# Patient Record
Sex: MALE | Race: WHITE | Employment: FULL TIME | URBAN - METROPOLITAN AREA
[De-identification: names, ages, dates, MRNs, and addresses within clinical notes are randomized per-mention and may not be internally consistent; named-entity substitution may affect disease eponyms.]

---

## 2021-05-04 ENCOUNTER — HOSPITAL ENCOUNTER (EMERGENCY)
Age: 31
Discharge: HOME | End: 2021-05-04
Attending: EMERGENCY MEDICINE
Payer: COMMERCIAL

## 2021-05-04 VITALS
OXYGEN SATURATION: 96 % | HEIGHT: 72 IN | RESPIRATION RATE: 16 BRPM | DIASTOLIC BLOOD PRESSURE: 99 MMHG | TEMPERATURE: 99.3 F | WEIGHT: 286 LBS | BODY MASS INDEX: 38.74 KG/M2 | SYSTOLIC BLOOD PRESSURE: 147 MMHG | HEART RATE: 90 BPM

## 2021-05-04 DIAGNOSIS — Z77.098 EXPOSURE TO CHEMICAL COMPOUNDS: Primary | ICD-10-CM

## 2021-05-04 LAB
AMPHETAMINE SCREEN URINE: NEGATIVE
BARBITURATE SCREEN URINE: NEGATIVE
BENZODIAZEPINE SCREEN, URINE: NEGATIVE
BUPRENORPHINE URINE: ABNORMAL
CANNABINOID SCREEN URINE: POSITIVE
COCAINE METABOLITE, URINE: NEGATIVE
MDMA URINE: ABNORMAL
METHADONE SCREEN, URINE: NEGATIVE
METHAMPHETAMINE, URINE: ABNORMAL
OPIATES, URINE: NEGATIVE
OXYCODONE SCREEN URINE: NEGATIVE
PHENCYCLIDINE, URINE: NEGATIVE
PROPOXYPHENE, URINE: ABNORMAL
TEST INFORMATION: ABNORMAL
TRICYCLIC ANTIDEPRESSANTS, UR: ABNORMAL

## 2021-05-04 PROCEDURE — 80307 DRUG TEST PRSMV CHEM ANLYZR: CPT

## 2021-05-04 PROCEDURE — 99283 EMERGENCY DEPT VISIT LOW MDM: CPT

## 2021-05-04 ASSESSMENT — ENCOUNTER SYMPTOMS
COUGH: 0
SORE THROAT: 0
ABDOMINAL PAIN: 0
NAUSEA: 0
SHORTNESS OF BREATH: 0
VOMITING: 0

## 2021-05-04 NOTE — ED PROVIDER NOTES
[id number] [facility]  [address] [facility] [address]  Phone: [phone]  Fax: [phone]      Attending Physician Attestation    I performed a history and physical examination of the patient and discussed management with the mid level provider. I reviewed the mid level provider's note and agree with the documented findings and plan of care. Any areas of disagreement are noted on the chart. I was personally present for the key portions of any procedures. I have documented in the chart those procedures where I was not present during the key portions. I have reviewed the emergency nurses triage note. I agree with the chief complaint, past medical history, past surgical history, allergies, medications, social and family history as documented unless otherwise noted below. Documentation of the HPI, Physical Exam and Medical Decision Making performed by mid level providers is based on my personal performance of the HPI, PE and MDM. For Physician Assistant/ Nurse Practitioner cases/documentation I have personally evaluated this patient and have completed at least one if not all key elements of the E/M (history, physical exam, and MDM). Additional findings are as noted. CHIEF COMPLAINT       Chief Complaint   Patient presents with    Chemical Exposure     states that he may have come in contact with possibly suboxone or fentanyl while going through his ex's backpack who is addicted and found a straw     Tachycardia     stayed around 130bpm for about 1 hour, came in contact with substance around [address] is a 32 y.o. male who presents with possible chemical exposure, tachycardia, no symptoms at current time. PAST MEDICAL HISTORY    has no past medical history on file. SURGICAL HISTORY      has no past surgical history on file.     CURRENT MEDICATIONS       Previous Medications    No medications on file       ALLERGIES     is allergic to penicillins. FAMILY HISTORY     has no family status information on file. family history is not on file. SOCIAL HISTORY          PHYSICAL EXAM     INITIAL VITALS:  height is 6' (1.829 m) and weight is 129.7 kg (286 lb). His oral temperature is 99.3 °F (37.4 °C). His blood pressure is 147/99 (abnormal) and his pulse is 90. His respiration is 16 and oxygen saturation is 96%. The head is normocephalic The neck is supple trachea midline no adenopathy no meningeal signs  Cardiac S1-S2 with a regular rate and rhythm  Pulmonary is clear to auscultation bilateral  Abdomen is soft nontender nondistended with positive bowel sounds  Extremities are warm and dry with good pulses motor sensation throughout  Skin is without rashes or lesions  Neurologic GCS is 15 and no focal deficits are appreciated    DIAGNOSTIC RESULTS         LABS:  Results for orders placed or performed during the hospital encounter of 05/04/21   Urine Drug Screen   Result Value Ref Range    Amphetamine Screen, Ur NEGATIVE NEGATIVE    Barbiturate Screen, Ur NEGATIVE NEGATIVE    Benzodiazepine Screen, Urine NEGATIVE NEGATIVE    Cocaine Metabolite, Urine NEGATIVE NEGATIVE    Methadone Screen, Urine NEGATIVE NEGATIVE    Opiates, Urine NEGATIVE NEGATIVE    Phencyclidine, Urine NEGATIVE NEGATIVE    Propoxyphene, Urine NOT REPORTED NEGATIVE    Cannabinoid Scrn, Ur POSITIVE (A) NEGATIVE    Oxycodone Screen, Ur NEGATIVE NEGATIVE    Methamphetamine, Urine NOT REPORTED NEGATIVE    Tricyclic Antidepressants, Urine NOT REPORTED NEGATIVE    MDMA, Urine NOT REPORTED NEGATIVE    Buprenorphine Urine NOT REPORTED NEGATIVE    Test Information       Assay provides medical screening only. The absence of expected drug(s) and/or metabolite(s) may indicate diluted or adulterated urine, limitations of testing or timing of collection.            EMERGENCY DEPARTMENT COURSE:   Vitals:    Vitals:    05/04/21 1730   BP: (!) 147/99   Pulse: 90   Resp: 16 Temp: 99.3 °F (37.4 °C)   TempSrc: Oral   SpO2: 96%   Weight: 129.7 kg (286 lb)   Height: 6' (1.829 m)     -------------------------  BP: (!) 147/99, Temp: 99.3 °F (37.4 °C), Pulse: 90, Resp: 16      PERTINENT ATTENDING PHYSICIAN COMMENTS:    Urine tox screen is positive for marijuana use which the patient openly admits to the patient has no complaints feels at his baseline status has no respiratory depression given this I do feel able to be followed up as an outpatient      (Please note that portions of this note were completed with a voice recognition program.  Efforts were made to edit the dictations but occasionally words are mis-transcribed.)    Vu MD, F.A.C.E.P.   Attending Emergency Medicine Physician       Dm Patton MD  05/04/21 3798

## 2021-05-04 NOTE — ED NOTES
Patient provided with discharge instructions, prescriptions, and follow up information. Verbalized understanding. No IV access to discontinue. A&OX3. Steady gait noted at discharge. Wheelchair declined by patient.       Dez Hernandes RN  05/04/21 5618

## 2021-05-04 NOTE — ED PROVIDER NOTES
[address] [facility]  [address] [facility] [address]  Phone: [phone]  Fax: [phone]        Pt Name: Joey Castorena  MRN: [id number]  [place] 1990  Date of evaluation: 5/4/21    [address] [place] [address]       Chief Complaint   Patient presents with    Chemical Exposure     states that he may have come in contact with possibly suboxone or fentanyl while going through his ex's backpack who is addicted and found a straw     Tachycardia     stayed around 130bpm for about 1 hour, came in contact with substance around 1515       HISTORY OF PRESENT ILLNESS (Location/Symptom, Timing/Onset, Context/Setting, Quality, Duration, Modifying Factors, Severity)      Joey Castorena is a 32 y.o. male with no pertinent PMH who presents to the ED via private auto with possible exposure to illicit drug. Patient states that around 315 this afternoon he had jumped his ex-girlfriend's backpack and out onto the floor in his home. He reports that he was going through it to make sure there was nothing else in there though the patient says that the ex-girlfriend (who is now at rehab) stated that there was not, however, he did notice a straw in a cookie bag and he knows that she is addicted to fentanyl so he is concerned because he may have touched things that may have had fentanyl in him. He reports that he does not recall touching his eyes or face. He did wash his hands immediately afterwards. Patient says that shortly afterward he began experiencing tachycardia and his heart rate was around 130 bpm for about an hour. Patient states that it was either \"anxiety or the fentanyl. \"  He says that he laid down and that his heart rate improved however he did want to get checked out to make sure there were not any other complications of these medications. Denies any other medications in the bag other than her antidepressants. Denies any oral or nasal intake of these substances.   He does note that he was little lightheaded when he was tachycardic, but says that this is completely resolved. Denies any exacerbating relieving factors. Denies taking any medication for his discomfort. Denies any fever, chills, vision changes, syncope, nausea, vomiting, diarrhea, abdominal pain, chest pain, shortness of breath, or any other concerns at this time. PAST MEDICAL / SURGICAL / SOCIAL / FAMILY HISTORY     PMH:  has no past medical history on file. Surgical History:  has no past surgical history on file. Social History:    Family History: has no family status information on file. family history is not on file. Psychiatric History: None    Allergies: Penicillins    Home Medications:   Prior to Admission medications    Not on File       REVIEW OF SYSTEMS  (2-9 systems for level 4, 10 ormore for level 5)      Review of Systems   Constitutional: Negative for chills and fever. HENT: Negative for sore throat. Eyes: Negative for visual disturbance. Respiratory: Negative for cough and shortness of breath. Cardiovascular: Negative for chest pain. Gastrointestinal: Negative for abdominal pain, nausea and vomiting. Skin: Negative for rash. Allergic/Immunologic: Negative for immunocompromised state. Neurological: Positive for light-headedness (Now resolved). Negative for tremors, seizures, syncope, weakness and headaches. Psychiatric/Behavioral: Negative for agitation. PHYSICAL EXAM  (up to 7 for level 4, 8 or more for level 5)      INITIAL VITALS:  height is 6' (1.829 m) and weight is 129.7 kg (286 lb). His oral temperature is 99.3 °F (37.4 °C). His blood pressure is 147/99 (abnormal) and his pulse is 90. His respiration is 16 and oxygen saturation is 96%. Vital signs reviewed. Physical Exam    General:  Alert, cooperative, well-groomed, well-nourished, appears stated age, and is in no acute distress. Head:  Normocephalic, atraumatic, and without obvious abnormality.    Eyes:  Sclerae/conjunctivae Opiates, Urine NEGATIVE NEGATIVE    Phencyclidine, Urine NEGATIVE NEGATIVE    Propoxyphene, Urine NOT REPORTED NEGATIVE    Cannabinoid Scrn, Ur POSITIVE (A) NEGATIVE    Oxycodone Screen, Ur NEGATIVE NEGATIVE    Methamphetamine, Urine NOT REPORTED NEGATIVE    Tricyclic Antidepressants, Urine NOT REPORTED NEGATIVE    MDMA, Urine NOT REPORTED NEGATIVE    Buprenorphine Urine NOT REPORTED NEGATIVE    Test Information       Assay provides medical screening only. The absence of expected drug(s) and/or metabolite(s) may indicate diluted or adulterated urine, limitations of testing or timing of collection. EMERGENCY DEPARTMENT COURSE           Vitals:    Vitals:    05/04/21 1730   BP: (!) 147/99   Pulse: 90   Resp: 16   Temp: 99.3 °F (37.4 °C)   TempSrc: Oral   SpO2: 96%   Weight: 129.7 kg (286 lb)   Height: 6' (1.829 m)     -------------------------  BP: (!) 147/99, Temp: 99.3 °F (37.4 °C), Pulse: 90, Resp: 16      RE-EVALUATION:  Patient updated regarding results which were negative other than cannabis which he has smoked in the past, but says that he stopped on April 10 and is trying to stop completely. The patient and/or family and I have discussed the diagnosis and risks, and we agree with discharging home to follow-up with their primary doctor and he was given the same day number to establish care. The patient appears stable for discharge and has been instructed to return immediately for new concerning symptoms, if the symptoms worsen in any way, or in 8-12 hours if not improved for re-evaluation. We have discussed the symptoms which are most concerning (e.g., fever, changing or worsening pain, persistent vomiting, bloody stools, chest pain, shortness of breath, numbness or weakness to the arms or legs, coolness or color change to the arms or legs) that necessitate immediate return.      The patient understands that at this time there is no evidence for a more malignant underlying process, but the patient also understands that early in the process of an illness or injury, an emergency department workup can be falsely reassuring. Routine discharge counseling was given, and the patient understands that worsening, changing or persistent symptoms should prompt an immediate call or follow up with their primary physician or return to the emergency department. The importance of appropriate follow up was also discussed. I have reviewed the disposition diagnosis with the patient and or their family/guardian. I have answered their questions and given discharge instructions. They voiced understanding of these instructions and did not have any further questions or complaints. This patient was seen by the attending physician and they agreed with the assessment and plan. CONSULTS:  None    PROCEDURES:  None    FINAL IMPRESSION      1. Exposure to chemical compounds          DISPOSITION / PLAN     CONDITION ON DISPOSITION:   Good / Stable for discharge. PATIENT REFERRED TO:  Your PCP or call [phone]    Call today  To schedule appointment to establish care with a primary care provider      DISCHARGE MEDICATIONS:  There are no discharge medications for this patient.       Keith Dlilard PA-C   Emergency Medicine Physician Assistant    (Please note that portions of this note were completed with a voice recognition program.  Efforts were made to edit the dictations but occasionally words aremis-transcribed.)        Keith Dillard PA-C  05/04/21 8886

## 2024-05-06 ENCOUNTER — OFFICE VISIT (OUTPATIENT)
Age: 34
End: 2024-05-06
Payer: COMMERCIAL

## 2024-05-06 VITALS — WEIGHT: 286 LBS | BODY MASS INDEX: 38.74 KG/M2 | HEIGHT: 72 IN

## 2024-05-06 DIAGNOSIS — S52.502A CLOSED FRACTURE OF DISTAL END OF LEFT RADIUS, UNSPECIFIED FRACTURE MORPHOLOGY, INITIAL ENCOUNTER: Primary | ICD-10-CM

## 2024-05-06 DIAGNOSIS — M25.532 LEFT WRIST PAIN: ICD-10-CM

## 2024-05-06 PROCEDURE — 25600 CLTX DST RDL FX/EPHYS SEP WO: CPT | Performed by: NURSE PRACTITIONER

## 2024-05-06 PROCEDURE — 99204 OFFICE O/P NEW MOD 45 MIN: CPT | Performed by: NURSE PRACTITIONER

## 2024-05-06 NOTE — PROGRESS NOTES
[facility]      [facility] PHYSICIANS [facility], [facility]  [facility] ORTHOPAEDICS [facility] SPORTS MEDICINE  [address]  [place] [address]  Dept: [phone]  Dept Fax: [phone]    Chief Compliant:  Chief Complaint   Patient presents with    Wrist Pain     L wrist fx        History of Present Illness:  This is a pleasant 34 y.o. male who is here for evaluation of a left distal radius fracture. States he slipped on a mat at home yesterday morning, falling backwards onto an outstretched left wrist. He went to a local urgent care and was diagnosed with a wrist fracture then placed in a cock up wrist brace. He states his pain is controlled as long as he does not move his thumb. He denies any new injuries or falls. Denies any numbness or tingling. 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 History of left ulnar shaft fracture as a child that did not heal correctly per patient.     Physical Exam: Brace removed. Skin intact. Swelling and ecchymosis. Able to wrinkle the skin. Able to make a complete fist. Sensation intact to light touch.     Imagin views of the left wrist demonstrate an acute, intra-articular  impacted distal radius fracture with no significant change in alignment compared to previous imaging. Overlying cast material in place.       Assessment and Plan:    This is a pleasant 34 y.o. male who has a left intra-articular distal radius fracture. Reviewed imaging with the patient and discussed plan of care. Patient was placed in a short arm cast and tolerated the procedure well. Discussed the current plan is to treat non-operatively with immobilization in a short arm cast but explained that there is still a chance that he may need surgery if the fracture is not stable/loss of radial height. Plan to follow up in 1 week for repeat xrays. Instructed to elevate the arm, NSAIDs, no lifting/pulling/pushing with the left arm.

## 2024-05-14 ENCOUNTER — OFFICE VISIT (OUTPATIENT)
Age: 34
End: 2024-05-14

## 2024-05-14 VITALS — BODY MASS INDEX: 38.74 KG/M2 | WEIGHT: 286 LBS | HEIGHT: 72 IN

## 2024-05-14 DIAGNOSIS — S52.502A CLOSED FRACTURE OF DISTAL END OF LEFT RADIUS, UNSPECIFIED FRACTURE MORPHOLOGY, INITIAL ENCOUNTER: ICD-10-CM

## 2024-05-14 DIAGNOSIS — M25.532 LEFT WRIST PAIN: Primary | ICD-10-CM

## 2024-05-14 PROCEDURE — 99024 POSTOP FOLLOW-UP VISIT: CPT | Performed by: ORTHOPAEDIC SURGERY

## 2024-05-14 RX ORDER — IBUPROFEN 800 MG/1
800 TABLET ORAL EVERY 8 HOURS PRN
COMMUNITY
Start: 2024-05-05

## 2024-05-14 RX ORDER — HYDROCODONE BITARTRATE AND ACETAMINOPHEN 5; 325 MG/1; MG/1
TABLET ORAL
COMMUNITY
Start: 2024-05-05

## 2024-05-14 RX ORDER — ESCITALOPRAM OXALATE 10 MG/1
TABLET ORAL
COMMUNITY
Start: 2024-03-22

## 2024-05-14 RX ORDER — AZITHROMYCIN 250 MG/1
TABLET, FILM COATED ORAL
COMMUNITY
Start: 2024-02-05

## 2024-05-15 NOTE — PROGRESS NOTES
[facility]      [facility] PHYSICIANS [facility]  [facility] ORTHOPAEDICS AND SPORTS MEDICINE  [address]  [place] [address]  Dept: [phone]  Dept Fax: [phone]    Chief Compliant:  Chief Complaint   Patient presents with    Fracture     Left wrist fx f/u        History of Present Illness:  This is a pleasant 34 y.o. male who is 1 week into conservative treatment for a left distal radius fracture intra-articular minimally displaced.  He has been in a cast.  Pain is controlled.    Physical Exam: The left wrist cast is well-formed.  I did cut back a couple areas around the thumb.    Imaging: X-rays of the left wrist 2 views taken today show good alignment of his distal radius fracture.  This is intra-articular.  There is been no significant displacement.  Articular displacement is 1.2 mm.  There is still good radial height and volar tilt and inclination.      Assessment and Plan:    This is a pleasant 34 y.o. male who has a left distal radius intra-articular fracture that is still appropriate for nonoperative treatment.  Will see him back in 1 week with repeat x-rays in the cast AP and lateral.         Past History:    Current Outpatient Medications:     azithromycin (ZITHROMAX) 250 MG tablet, TAKE 2 TABLETS BY MOUTH ON DAY 1, THEN 1 TABLET DAILY ON DAYS 2 THRU 5  START 2 DAYS PRIOR TO APPOINTMENT, Disp: , Rfl:     escitalopram (LEXAPRO) 10 MG tablet, Take one tablet daily along with 5 mg daily to equal 15 mg daily, Disp: , Rfl:     HYDROcodone-acetaminophen (NORCO) 5-325 MG per tablet, Take 1 tablet by mouth every 6 hours as needed (severe pain) for up to 3 days.  Max Daily Amount 4 tablets, Disp: , Rfl:     ibuprofen (ADVIL;MOTRIN) 800 MG tablet, Take 1 tablet by mouth every 8 hours as needed, Disp: , Rfl:   Allergies   Allergen Reactions    Penicillins Anaphylaxis     Social History     Socioeconomic History    Marital status: Single

## 2024-05-23 ENCOUNTER — OFFICE VISIT (OUTPATIENT)
Age: 34
End: 2024-05-23

## 2024-05-23 VITALS — HEIGHT: 72 IN | WEIGHT: 286 LBS | BODY MASS INDEX: 38.74 KG/M2

## 2024-05-23 DIAGNOSIS — M25.532 LEFT WRIST PAIN: Primary | ICD-10-CM

## 2024-05-23 DIAGNOSIS — S52.502A CLOSED FRACTURE OF DISTAL END OF LEFT RADIUS, UNSPECIFIED FRACTURE MORPHOLOGY, INITIAL ENCOUNTER: ICD-10-CM

## 2024-05-23 PROCEDURE — 99024 POSTOP FOLLOW-UP VISIT: CPT | Performed by: ORTHOPAEDIC SURGERY

## 2024-05-23 NOTE — PROGRESS NOTES
[facility] Orthopedics & Sports Medicine      [facility] PHYSICIANS [facility]  [facility] OR[facility]OPAED[facility]ORTS MEDICINE  [address]  [place] [address]  Dept: [phone]  Dept Fax: [phone]    Chief Compliant:  Chief Complaint   Patient presents with    Fracture     Left wrist fx f/u        History of Present Illness:  This is a pleasant 34 y.o. male who is now about 2 and half to 3 weeks into conservative treatment for a left distal radius intra-articular fracture.  He is in a short arm cast.  He states that he has been doing well although he did have an increase in pain over the radial aspect of the distal third forearm earlier this morning but he states that has gotten better.    Physical Exam: The left short arm cast is well-fitting.  There is no skin breakdown.    Imaging: X-rays of the left wrist 2 views taken today show relatively unchanged alignment.  Radial inclination is 22 degrees and same compared to prior x-ray.  Radial height looks good with no loss of radial height.  There is 15 degrees of volar tilt which is a few degrees more than prior x-ray.  The intra-articular displacement remains less than 2 mm.      Assessment and Plan:    This is a pleasant 34 y.o. male who is status post the above.  His fracture is intra-articular however the alignment is holding with short arm casting.  I do not see significant articular displacement, shortening, loss of radial inclination or significant change and volar tilt to necessitate surgery.  Because it is intra-articular I do still want to keep a close eye on him and we will repeat x-rays in 1 week with Shanika Figueroa CNP         Past History:    Current Outpatient Medications:     azithromycin (ZITHROMAX) 250 MG tablet, TAKE 2 TABLETS BY MOUTH ON DAY 1, THEN 1 TABLET DAILY ON DAYS 2 THRU 5  START 2 DAYS PRIOR TO APPOINTMENT, Disp: , Rfl:     escitalopram (LEXAPRO) 10 MG tablet, Take one tablet daily along with 5 mg

## 2024-05-30 ENCOUNTER — ANESTHESIA EVENT (OUTPATIENT)
Dept: OPERATING ROOM | Age: 34
End: 2024-05-30
Payer: COMMERCIAL

## 2024-05-30 ENCOUNTER — OFFICE VISIT (OUTPATIENT)
Age: 34
End: 2024-05-30
Payer: COMMERCIAL

## 2024-05-30 VITALS — BODY MASS INDEX: 38.74 KG/M2 | WEIGHT: 286 LBS | HEIGHT: 72 IN

## 2024-05-30 DIAGNOSIS — S52.502A CLOSED FRACTURE OF DISTAL END OF LEFT RADIUS, UNSPECIFIED FRACTURE MORPHOLOGY, INITIAL ENCOUNTER: Primary | ICD-10-CM

## 2024-05-30 DIAGNOSIS — M25.532 LEFT WRIST PAIN: ICD-10-CM

## 2024-05-30 PROCEDURE — 99213 OFFICE O/P EST LOW 20 MIN: CPT | Performed by: ORTHOPAEDIC SURGERY

## 2024-05-30 NOTE — PROGRESS NOTES
[facility] Orthopedics & Sports Medicine      [facility] PHYSICIANS [facility], [facility]  [facility] ORTHOPAEDICS AND SPORTS MEDICINE  [address]  [place] [address]  Dept: [phone]  Dept Fax: [phone]    Chief Compliant:  Chief Complaint   Patient presents with    Fracture     Left wrist fx f/u        History of Present Illness:  This is a pleasant 34 y.o. male who is here for follow up regarding his left distal radius fracture that occurred on 24, almost 3 weeks ago. He has been managed conservatively in a short arm cast with weekly xrays. He states he has minimal to no pain. Denies any issues with the cast. Denies any new injuries or falls.     Physical Exam: Left wrist: Short arm cast in place, in good condition. Exposed digits have good range of motion, sensation intact, good capillary refill.     Imagin views of the left wrist re-demonstrate a comminuted, impacted distal radius fracture with some loss of radial height compared to previous imaging. The intra-articular displacement looks relatively unchanged. Overlying cast material in place.       Assessment and Plan:    This is a pleasant 34 y.o. male who has a left intra-articular distal radius fracture. Reviewed imaging with the patient and again discussed risks/benefits of conservative and surgical treatment options. He is now 3 weeks from initial injury, treated conservatively in a short arm cast and unfortunately the fracture continues to shift. At this time, I recommend surgical intervention for stability, to restore anatomic alignment is much as possible as well as to prevent further displacement I discussed with the patient risks benefits and alternatives of distal radius fracture open reduction internal fixation. Risks include but are not limited to long-term stiffness, infection, injury to surrounding structures, nonunion, symptomatic hardware, risk from anesthetic. Patient verbalized understanding and

## 2024-05-31 ENCOUNTER — ANESTHESIA (OUTPATIENT)
Dept: OPERATING ROOM | Age: 34
End: 2024-05-31
Payer: COMMERCIAL

## 2024-05-31 ENCOUNTER — HOSPITAL ENCOUNTER (OUTPATIENT)
Age: 34
Setting detail: OUTPATIENT SURGERY
Discharge: HOME | End: 2024-05-31
Attending: ORTHOPAEDIC SURGERY | Admitting: ORTHOPAEDIC SURGERY
Payer: COMMERCIAL

## 2024-05-31 ENCOUNTER — APPOINTMENT (OUTPATIENT)
Dept: GENERAL RADIOLOGY | Age: 34
End: 2024-05-31
Attending: ORTHOPAEDIC SURGERY
Payer: COMMERCIAL

## 2024-05-31 VITALS
HEIGHT: 72 IN | TEMPERATURE: 96.8 F | DIASTOLIC BLOOD PRESSURE: 75 MMHG | WEIGHT: 286 LBS | OXYGEN SATURATION: 95 % | SYSTOLIC BLOOD PRESSURE: 136 MMHG | BODY MASS INDEX: 38.74 KG/M2 | HEART RATE: 100 BPM | RESPIRATION RATE: 14 BRPM

## 2024-05-31 DIAGNOSIS — G89.18 POST-OP PAIN: Primary | ICD-10-CM

## 2024-05-31 PROBLEM — S52.572A OTHER INTRAARTICULAR FRACTURE OF LOWER END OF LEFT RADIUS, INITIAL ENCOUNTER FOR CLOSED FRACTURE: Status: ACTIVE | Noted: 2024-05-31

## 2024-05-31 PROCEDURE — 2720000010 HC SURG SUPPLY STERILE: Performed by: ORTHOPAEDIC SURGERY

## 2024-05-31 PROCEDURE — C1713 ANCHOR/SCREW BN/BN,TIS/BN: HCPCS | Performed by: ORTHOPAEDIC SURGERY

## 2024-05-31 PROCEDURE — 6360000002 HC RX W HCPCS

## 2024-05-31 PROCEDURE — 7100000011 HC PHASE II RECOVERY - ADDTL 15 MIN: Performed by: ORTHOPAEDIC SURGERY

## 2024-05-31 PROCEDURE — 64415 NJX AA&/STRD BRCH PLXS IMG: CPT | Performed by: ANESTHESIOLOGY

## 2024-05-31 PROCEDURE — 7100000000 HC PACU RECOVERY - FIRST 15 MIN: Performed by: ORTHOPAEDIC SURGERY

## 2024-05-31 PROCEDURE — 3600000004 HC SURGERY LEVEL 4 BASE: Performed by: ORTHOPAEDIC SURGERY

## 2024-05-31 PROCEDURE — 7100000001 HC PACU RECOVERY - ADDTL 15 MIN: Performed by: ORTHOPAEDIC SURGERY

## 2024-05-31 PROCEDURE — 2500000003 HC RX 250 WO HCPCS: Performed by: SPECIALIST

## 2024-05-31 PROCEDURE — 2709999900 HC NON-CHARGEABLE SUPPLY: Performed by: ORTHOPAEDIC SURGERY

## 2024-05-31 PROCEDURE — 25609 OPTX DST RD XART FX/EP SEP3+: CPT | Performed by: ORTHOPAEDIC SURGERY

## 2024-05-31 PROCEDURE — 2580000003 HC RX 258: Performed by: ANESTHESIOLOGY

## 2024-05-31 PROCEDURE — 3700000001 HC ADD 15 MINUTES (ANESTHESIA): Performed by: ORTHOPAEDIC SURGERY

## 2024-05-31 PROCEDURE — 6360000002 HC RX W HCPCS: Performed by: ANESTHESIOLOGY

## 2024-05-31 PROCEDURE — 6360000002 HC RX W HCPCS: Performed by: NURSE PRACTITIONER

## 2024-05-31 PROCEDURE — 3700000000 HC ANESTHESIA ATTENDED CARE: Performed by: ORTHOPAEDIC SURGERY

## 2024-05-31 PROCEDURE — 3600000014 HC SURGERY LEVEL 4 ADDTL 15MIN: Performed by: ORTHOPAEDIC SURGERY

## 2024-05-31 PROCEDURE — 2500000003 HC RX 250 WO HCPCS: Performed by: ANESTHESIOLOGY

## 2024-05-31 PROCEDURE — 6360000002 HC RX W HCPCS: Performed by: SPECIALIST

## 2024-05-31 PROCEDURE — 25609 OPTX DST RD XART FX/EP SEP3+: CPT | Performed by: NURSE PRACTITIONER

## 2024-05-31 PROCEDURE — 7100000010 HC PHASE II RECOVERY - FIRST 15 MIN: Performed by: ORTHOPAEDIC SURGERY

## 2024-05-31 PROCEDURE — 6370000000 HC RX 637 (ALT 250 FOR IP)

## 2024-05-31 DEVICE — SCREW BNE L14MM DIA2.7MM CORT S STL ST T8 STARDRV RECESS: Type: IMPLANTABLE DEVICE | Site: WRIST | Status: FUNCTIONAL

## 2024-05-31 DEVICE — SCREW BNE L16MM DIA2.4MM CORT S STL ST T8 STARDRV RECESS: Type: IMPLANTABLE DEVICE | Site: WRIST | Status: FUNCTIONAL

## 2024-05-31 DEVICE — SCREW BNE L20MM DIA2.4MM DST RAD VOLAR S STL ST VAR ANG LOK: Type: IMPLANTABLE DEVICE | Site: WRIST | Status: FUNCTIONAL

## 2024-05-31 DEVICE — SCREW BNE L22MM DIA2.4MM DST RAD VOLAR S STL ST VAR ANG LOK: Type: IMPLANTABLE DEVICE | Site: WRIST | Status: FUNCTIONAL

## 2024-05-31 DEVICE — PLATE BNE L55MM 7X3 H L VOLAR DST RAD S STL VAR ANG LOK: Type: IMPLANTABLE DEVICE | Site: WRIST | Status: FUNCTIONAL

## 2024-05-31 RX ORDER — CEFAZOLIN 2 G/1
INJECTION, POWDER, FOR SOLUTION INTRAMUSCULAR; INTRAVENOUS
Status: DISCONTINUED
Start: 2024-05-31 | End: 2024-05-31 | Stop reason: HOSPADM

## 2024-05-31 RX ORDER — OXYCODONE HYDROCHLORIDE AND ACETAMINOPHEN 5; 325 MG/1; MG/1
2 TABLET ORAL
Status: DISCONTINUED | OUTPATIENT
Start: 2024-05-31 | End: 2024-05-31 | Stop reason: HOSPADM

## 2024-05-31 RX ORDER — SODIUM CHLORIDE 0.9 % (FLUSH) 0.9 %
5-40 SYRINGE (ML) INJECTION EVERY 12 HOURS SCHEDULED
Status: DISCONTINUED | OUTPATIENT
Start: 2024-05-31 | End: 2024-05-31 | Stop reason: HOSPADM

## 2024-05-31 RX ORDER — SODIUM CHLORIDE 0.9 % (FLUSH) 0.9 %
5-40 SYRINGE (ML) INJECTION PRN
Status: DISCONTINUED | OUTPATIENT
Start: 2024-05-31 | End: 2024-05-31 | Stop reason: HOSPADM

## 2024-05-31 RX ORDER — NALOXONE HYDROCHLORIDE 0.4 MG/ML
INJECTION, SOLUTION INTRAMUSCULAR; INTRAVENOUS; SUBCUTANEOUS PRN
Status: DISCONTINUED | OUTPATIENT
Start: 2024-05-31 | End: 2024-05-31 | Stop reason: HOSPADM

## 2024-05-31 RX ORDER — MIDAZOLAM HYDROCHLORIDE 2 MG/2ML
2 INJECTION, SOLUTION INTRAMUSCULAR; INTRAVENOUS ONCE
Status: COMPLETED | OUTPATIENT
Start: 2024-05-31 | End: 2024-05-31

## 2024-05-31 RX ORDER — ROPIVACAINE HYDROCHLORIDE 5 MG/ML
INJECTION, SOLUTION EPIDURAL; INFILTRATION; PERINEURAL
Status: COMPLETED | OUTPATIENT
Start: 2024-05-31 | End: 2024-05-31

## 2024-05-31 RX ORDER — ONDANSETRON 2 MG/ML
4 INJECTION INTRAMUSCULAR; INTRAVENOUS
Status: COMPLETED | OUTPATIENT
Start: 2024-05-31 | End: 2024-05-31

## 2024-05-31 RX ORDER — OXYCODONE HYDROCHLORIDE AND ACETAMINOPHEN 5; 325 MG/1; MG/1
1-2 TABLET ORAL EVERY 6 HOURS PRN
Qty: 40 TABLET | Refills: 0 | Status: SHIPPED | OUTPATIENT
Start: 2024-05-31 | End: 2024-06-07

## 2024-05-31 RX ORDER — EPHEDRINE SULFATE/0.9% NACL/PF 25 MG/5 ML
SYRINGE (ML) INTRAVENOUS PRN
Status: DISCONTINUED | OUTPATIENT
Start: 2024-05-31 | End: 2024-05-31 | Stop reason: SDUPTHER

## 2024-05-31 RX ORDER — FENTANYL CITRATE 50 UG/ML
100 INJECTION, SOLUTION INTRAMUSCULAR; INTRAVENOUS ONCE
Status: COMPLETED | OUTPATIENT
Start: 2024-05-31 | End: 2024-05-31

## 2024-05-31 RX ORDER — IPRATROPIUM BROMIDE AND ALBUTEROL SULFATE 2.5; .5 MG/3ML; MG/3ML
1 SOLUTION RESPIRATORY (INHALATION)
Status: DISCONTINUED | OUTPATIENT
Start: 2024-05-31 | End: 2024-05-31 | Stop reason: HOSPADM

## 2024-05-31 RX ORDER — MIDAZOLAM HYDROCHLORIDE 2 MG/2ML
2 INJECTION, SOLUTION INTRAMUSCULAR; INTRAVENOUS
Status: DISCONTINUED | OUTPATIENT
Start: 2024-05-31 | End: 2024-05-31 | Stop reason: HOSPADM

## 2024-05-31 RX ORDER — SODIUM CHLORIDE 9 MG/ML
INJECTION, SOLUTION INTRAVENOUS PRN
Status: DISCONTINUED | OUTPATIENT
Start: 2024-05-31 | End: 2024-05-31 | Stop reason: HOSPADM

## 2024-05-31 RX ORDER — MORPHINE SULFATE 2 MG/ML
2 INJECTION, SOLUTION INTRAMUSCULAR; INTRAVENOUS EVERY 5 MIN PRN
Status: DISCONTINUED | OUTPATIENT
Start: 2024-05-31 | End: 2024-05-31 | Stop reason: HOSPADM

## 2024-05-31 RX ORDER — ONDANSETRON 2 MG/ML
INJECTION INTRAMUSCULAR; INTRAVENOUS
Status: COMPLETED
Start: 2024-05-31 | End: 2024-05-31

## 2024-05-31 RX ORDER — SODIUM CHLORIDE, SODIUM LACTATE, POTASSIUM CHLORIDE, CALCIUM CHLORIDE 600; 310; 30; 20 MG/100ML; MG/100ML; MG/100ML; MG/100ML
INJECTION, SOLUTION INTRAVENOUS CONTINUOUS
Status: DISCONTINUED | OUTPATIENT
Start: 2024-05-31 | End: 2024-05-31 | Stop reason: HOSPADM

## 2024-05-31 RX ORDER — PROPOFOL 10 MG/ML
INJECTION, EMULSION INTRAVENOUS PRN
Status: DISCONTINUED | OUTPATIENT
Start: 2024-05-31 | End: 2024-05-31 | Stop reason: SDUPTHER

## 2024-05-31 RX ORDER — IBUPROFEN 800 MG/1
800 TABLET ORAL
Qty: 90 TABLET | Refills: 0 | Status: SHIPPED | OUTPATIENT
Start: 2024-05-31

## 2024-05-31 RX ORDER — OXYCODONE HYDROCHLORIDE AND ACETAMINOPHEN 5; 325 MG/1; MG/1
1 TABLET ORAL
Status: DISCONTINUED | OUTPATIENT
Start: 2024-05-31 | End: 2024-05-31 | Stop reason: HOSPADM

## 2024-05-31 RX ORDER — DEXAMETHASONE SODIUM PHOSPHATE 10 MG/ML
INJECTION, SOLUTION INTRAMUSCULAR; INTRAVENOUS PRN
Status: DISCONTINUED | OUTPATIENT
Start: 2024-05-31 | End: 2024-05-31 | Stop reason: SDUPTHER

## 2024-05-31 RX ORDER — DEXAMETHASONE SODIUM PHOSPHATE 10 MG/ML
10 INJECTION, SOLUTION INTRAMUSCULAR; INTRAVENOUS ONCE
Status: DISCONTINUED | OUTPATIENT
Start: 2024-05-31 | End: 2024-05-31 | Stop reason: HOSPADM

## 2024-05-31 RX ORDER — ONDANSETRON 2 MG/ML
INJECTION INTRAMUSCULAR; INTRAVENOUS PRN
Status: DISCONTINUED | OUTPATIENT
Start: 2024-05-31 | End: 2024-05-31 | Stop reason: SDUPTHER

## 2024-05-31 RX ORDER — FENTANYL CITRATE 50 UG/ML
INJECTION, SOLUTION INTRAMUSCULAR; INTRAVENOUS
Status: COMPLETED
Start: 2024-05-31 | End: 2024-05-31

## 2024-05-31 RX ORDER — KETOROLAC TROMETHAMINE 30 MG/ML
INJECTION, SOLUTION INTRAMUSCULAR; INTRAVENOUS PRN
Status: DISCONTINUED | OUTPATIENT
Start: 2024-05-31 | End: 2024-05-31 | Stop reason: SDUPTHER

## 2024-05-31 RX ORDER — LIDOCAINE HYDROCHLORIDE 10 MG/ML
1 INJECTION, SOLUTION EPIDURAL; INFILTRATION; INTRACAUDAL; PERINEURAL
Status: DISCONTINUED | OUTPATIENT
Start: 2024-05-31 | End: 2024-05-31 | Stop reason: HOSPADM

## 2024-05-31 RX ORDER — ACETAMINOPHEN 500 MG
1000 TABLET ORAL ONCE
Status: COMPLETED | OUTPATIENT
Start: 2024-05-31 | End: 2024-05-31

## 2024-05-31 RX ORDER — LABETALOL HYDROCHLORIDE 5 MG/ML
10 INJECTION, SOLUTION INTRAVENOUS
Status: DISCONTINUED | OUTPATIENT
Start: 2024-05-31 | End: 2024-05-31 | Stop reason: HOSPADM

## 2024-05-31 RX ORDER — GLYCOPYRROLATE 0.2 MG/ML
0.4 INJECTION INTRAMUSCULAR; INTRAVENOUS ONCE
Status: DISCONTINUED | OUTPATIENT
Start: 2024-05-31 | End: 2024-05-31 | Stop reason: HOSPADM

## 2024-05-31 RX ORDER — MIDAZOLAM HYDROCHLORIDE 1 MG/ML
INJECTION INTRAMUSCULAR; INTRAVENOUS
Status: COMPLETED
Start: 2024-05-31 | End: 2024-05-31

## 2024-05-31 RX ORDER — ACETAMINOPHEN 500 MG
TABLET ORAL
Status: COMPLETED
Start: 2024-05-31 | End: 2024-05-31

## 2024-05-31 RX ORDER — PHENYLEPHRINE HCL IN 0.9% NACL 1 MG/10 ML
SYRINGE (ML) INTRAVENOUS PRN
Status: DISCONTINUED | OUTPATIENT
Start: 2024-05-31 | End: 2024-05-31 | Stop reason: SDUPTHER

## 2024-05-31 RX ORDER — METOCLOPRAMIDE HYDROCHLORIDE 5 MG/ML
10 INJECTION INTRAMUSCULAR; INTRAVENOUS
Status: DISCONTINUED | OUTPATIENT
Start: 2024-05-31 | End: 2024-05-31 | Stop reason: HOSPADM

## 2024-05-31 RX ORDER — LIDOCAINE HYDROCHLORIDE 10 MG/ML
INJECTION, SOLUTION INFILTRATION; PERINEURAL PRN
Status: DISCONTINUED | OUTPATIENT
Start: 2024-05-31 | End: 2024-05-31 | Stop reason: SDUPTHER

## 2024-05-31 RX ORDER — LIDOCAINE HYDROCHLORIDE 20 MG/ML
INJECTION, SOLUTION INFILTRATION; PERINEURAL
Status: COMPLETED | OUTPATIENT
Start: 2024-05-31 | End: 2024-05-31

## 2024-05-31 RX ORDER — MEPERIDINE HYDROCHLORIDE 50 MG/ML
12.5 INJECTION INTRAMUSCULAR; INTRAVENOUS; SUBCUTANEOUS EVERY 5 MIN PRN
Status: DISCONTINUED | OUTPATIENT
Start: 2024-05-31 | End: 2024-05-31 | Stop reason: HOSPADM

## 2024-05-31 RX ORDER — DIPHENHYDRAMINE HYDROCHLORIDE 50 MG/ML
12.5 INJECTION INTRAMUSCULAR; INTRAVENOUS
Status: DISCONTINUED | OUTPATIENT
Start: 2024-05-31 | End: 2024-05-31 | Stop reason: HOSPADM

## 2024-05-31 RX ORDER — DEXMEDETOMIDINE HYDROCHLORIDE 100 UG/ML
INJECTION, SOLUTION INTRAVENOUS PRN
Status: DISCONTINUED | OUTPATIENT
Start: 2024-05-31 | End: 2024-05-31 | Stop reason: SDUPTHER

## 2024-05-31 RX ORDER — HYDRALAZINE HYDROCHLORIDE 20 MG/ML
10 INJECTION INTRAMUSCULAR; INTRAVENOUS
Status: DISCONTINUED | OUTPATIENT
Start: 2024-05-31 | End: 2024-05-31 | Stop reason: HOSPADM

## 2024-05-31 RX ADMIN — DEXMEDETOMIDINE 8 MCG: 100 INJECTION, SOLUTION INTRAVENOUS at 11:37

## 2024-05-31 RX ADMIN — LIDOCAINE HYDROCHLORIDE 10 ML: 20 INJECTION, SOLUTION INFILTRATION; PERINEURAL at 10:51

## 2024-05-31 RX ADMIN — Medication 100 MCG: at 12:26

## 2024-05-31 RX ADMIN — ONDANSETRON 4 MG: 2 INJECTION INTRAMUSCULAR; INTRAVENOUS at 12:31

## 2024-05-31 RX ADMIN — ROPIVACAINE HYDROCHLORIDE 10 ML: 5 INJECTION, SOLUTION EPIDURAL; INFILTRATION; PERINEURAL at 10:51

## 2024-05-31 RX ADMIN — EPHEDRINE SULFATE 5 MG: 5 INJECTION INTRAVENOUS at 12:17

## 2024-05-31 RX ADMIN — ACETAMINOPHEN 1000 MG: 500 TABLET ORAL at 09:32

## 2024-05-31 RX ADMIN — Medication 2000 MG: at 11:30

## 2024-05-31 RX ADMIN — Medication 100 MCG: at 11:48

## 2024-05-31 RX ADMIN — DEXMEDETOMIDINE 8 MCG: 100 INJECTION, SOLUTION INTRAVENOUS at 12:15

## 2024-05-31 RX ADMIN — EPHEDRINE SULFATE 10 MG: 5 INJECTION INTRAVENOUS at 12:07

## 2024-05-31 RX ADMIN — KETOROLAC TROMETHAMINE 30 MG: 30 INJECTION, SOLUTION INTRAMUSCULAR; INTRAVENOUS at 12:31

## 2024-05-31 RX ADMIN — FENTANYL CITRATE 100 MCG: 50 INJECTION INTRAMUSCULAR; INTRAVENOUS at 10:49

## 2024-05-31 RX ADMIN — Medication 200 MCG: at 12:03

## 2024-05-31 RX ADMIN — Medication 200 MCG: at 12:39

## 2024-05-31 RX ADMIN — SODIUM CHLORIDE, POTASSIUM CHLORIDE, SODIUM LACTATE AND CALCIUM CHLORIDE: 600; 310; 30; 20 INJECTION, SOLUTION INTRAVENOUS at 11:17

## 2024-05-31 RX ADMIN — Medication 1000 MG: at 09:32

## 2024-05-31 RX ADMIN — SODIUM CHLORIDE, POTASSIUM CHLORIDE, SODIUM LACTATE AND CALCIUM CHLORIDE: 600; 310; 30; 20 INJECTION, SOLUTION INTRAVENOUS at 11:47

## 2024-05-31 RX ADMIN — DEXMEDETOMIDINE 8 MCG: 100 INJECTION, SOLUTION INTRAVENOUS at 11:57

## 2024-05-31 RX ADMIN — MIDAZOLAM HYDROCHLORIDE 2 MG: 1 INJECTION, SOLUTION INTRAMUSCULAR; INTRAVENOUS at 10:49

## 2024-05-31 RX ADMIN — Medication 100 MCG: at 11:57

## 2024-05-31 RX ADMIN — Medication 100 MCG: at 12:17

## 2024-05-31 RX ADMIN — DEXAMETHASONE SODIUM PHOSPHATE 10 MG: 10 INJECTION INTRAMUSCULAR; INTRAVENOUS at 11:25

## 2024-05-31 RX ADMIN — ONDANSETRON 4 MG: 2 INJECTION INTRAMUSCULAR; INTRAVENOUS at 13:10

## 2024-05-31 RX ADMIN — DEXMEDETOMIDINE 8 MCG: 100 INJECTION, SOLUTION INTRAVENOUS at 11:30

## 2024-05-31 RX ADMIN — LIDOCAINE HYDROCHLORIDE 40 MG: 10 INJECTION, SOLUTION INFILTRATION; PERINEURAL at 11:21

## 2024-05-31 RX ADMIN — FENTANYL CITRATE 100 MCG: 50 INJECTION, SOLUTION INTRAMUSCULAR; INTRAVENOUS at 10:49

## 2024-05-31 RX ADMIN — EPHEDRINE SULFATE 10 MG: 5 INJECTION INTRAVENOUS at 12:11

## 2024-05-31 RX ADMIN — Medication 100 MCG: at 11:53

## 2024-05-31 RX ADMIN — MIDAZOLAM HYDROCHLORIDE 2 MG: 2 INJECTION, SOLUTION INTRAMUSCULAR; INTRAVENOUS at 10:49

## 2024-05-31 RX ADMIN — PROPOFOL 200 MG: 10 INJECTION, EMULSION INTRAVENOUS at 11:21

## 2024-05-31 ASSESSMENT — PAIN - FUNCTIONAL ASSESSMENT: PAIN_FUNCTIONAL_ASSESSMENT: 0-10

## 2024-05-31 NOTE — ANESTHESIA PROCEDURE NOTES
Peripheral Block    Patient location during procedure: pre-op  Reason for block: post-op pain management and at surgeon's request  Start time: 5/31/2024 10:50 AM  End time: 5/31/2024 10:51 AM  Staffing  Performed: anesthesiologist   Anesthesiologist: Javier Mora MD  Performed by: Javier Mora MD  Authorized by: Javier Mora MD    Preanesthetic Checklist  Completed: patient identified, IV checked, site marked, risks and benefits discussed, surgical/procedural consents, equipment checked, pre-op evaluation, timeout performed, anesthesia consent given, oxygen available, monitors applied/VS acknowledged, fire risk safety assessment completed and verbalized and blood product R/B/A discussed and consented  Peripheral Block   Patient position: sitting  Prep: ChloraPrep  Provider prep: mask and sterile gloves  Patient monitoring: cardiac monitor, continuous pulse ox, frequent blood pressure checks, IV access, oxygen and responsive to questions  Block type: Brachial plexus  Supraclavicular  Laterality: left  Injection technique: single-shot  Guidance: ultrasound guided    Needle   Needle type: insulated echogenic nerve stimulator needle   Needle gauge: 22 G  Needle localization: anatomical landmarks and ultrasound guidance  Needle length: 2 IN.  Assessment   Injection assessment: negative aspiration for heme, no paresthesia on injection, local visualized surrounding nerve on ultrasound and no intravascular symptoms  Paresthesia pain: none  Slow fractionated injection: yes  Hemodynamics: stable  Outcomes: uncomplicated and patient tolerated procedure well    Additional Notes  4MG DECADRON ADDED TO LOCAL ANESTHETIC SOLUTION  Medications Administered  lidocaine injection 2% - Perineural   10 mL - 5/31/2024 10:51:00 AM  ropivacaine (NAROPIN) injection 0.5% - Perineural   10 mL - 5/31/2024 10:51:00 AM

## 2024-05-31 NOTE — OP NOTE
Operative Note      Patient: Mahendra Mabry  YOB: 1990  MRN: [id number]    Date of Procedure: 5/31/2024    Pre-Op Diagnosis Codes:     * Closed fracture of distal end of left radius, unspecified fracture morphology, initial encounter [S52.502A] four-part intra-articular    Post-Op Diagnosis: Same       Procedure(s):  LEFT DISTAL RADIUS OPEN REDUCTION INTERNAL FIXATION WITH SYNTHES four-part intra-articular    Surgeon(s):  Alejandro Shelley MD    Assistant:   First Assistant: Shanika Figueroa APRN - CNP    Anesthesia: General    Estimated Blood Loss (mL): Minimal    Complications: None     Specimens:   * No specimens in log *    Implants:  Implant Name Type Inv. Item Serial No.  Lot No. LRB No. Used Action   PLATE BNE L55MM 7X3 H L VOLAR DST RAD S STL JAVIER ANG DESIREE - [id number]  PLATE BNE L55MM 7X3 H L VOLAR DST RAD S STL JAVIER ANG DESIREE  DEPUY SYNTHES [place]  Left 1 Implanted   SCREW BNE L20MM DIA2.4MM DST RAD VOLAR S STL ST JAVIER ANG DESIREE - [id number]  SCREW BNE L20MM DIA2.4MM DST RAD VOLAR S STL ST JAVIER ANG DESIREE  DEPUY SYNTHES [place]  Left 2 Implanted   SCREW BNE L22MM DIA2.4MM DST RAD VOLAR S STL ST JAVIER ANG DESIREE - [id number]  SCREW BNE L22MM DIA2.4MM DST RAD VOLAR S STL ST JVAIER ANG DESIREE  DEPUY SYNTHES [place]-WD  Left 3 Implanted   SCREW BNE L16MM DIA2.4MM ELIZABETH S STL ST T8 STARDRV RECESS - [id number]  SCREW BNE L16MM DIA2.4MM ELIZABETH S STL ST T8 STARDRV RECESS  DEPUY SYNTHES [place]-WD  Left 1 Implanted   SCREW BNE L14MM DIA2.7MM ELIZABETH S STL ST T8 STARDRV RECESS - [id number]  SCREW BNE L14MM DIA2.7MM ELIZABETH S STL ST T8 STARDRV RECESS  DEPUY SYNTHES [place]-WD  Left 2 Implanted         Drains: * No LDAs found *    Findings:  Infection Present At Time Of Surgery (PATOS) (choose all levels that have infection present):  No infection present  Other Findings:     Detailed Description of Procedure:   Informed consent was obtained.  I marked his left wrist.  He was brought back to the operating room where general anesthesia was  I will see him in 2 weeks.    Shanika Figueroa CNP was a first assistant for this case and was directly involved with each stage of the procedure including positioning, assistance during the procedure, implant placement if needed, and closure if needed.  There was no orthopedic resident available to assist.      DVT prophylaxis is ambulation    Electronically signed by Alejandro Shelley MD on 5/31/2024 at 2:26 PM

## 2024-05-31 NOTE — ANESTHESIA PRE PROCEDURE
72 hours.    Coags: No results found for: \"PROTIME\", \"INR\", \"APTT\"    HCG (If Applicable): No results found for: \"PREGTESTUR\", \"PREGSERUM\", \"HCG\", \"HCGQUANT\"     ABGs: No results found for: \"PHART\", \"PO2ART\", \"FIN4FRG\", \"IDI7MAK\", \"BEART\", \"A6NPSZQR\"     Type & Screen (If Applicable):  No results found for: \"LABABO\"    Drug/Infectious Status (If Applicable):  No results found for: \"HIV\", \"HEPCAB\"    COVID-19 Screening (If Applicable): No results found for: \"COVID19\"        Anesthesia Evaluation  Patient summary reviewed and Nursing notes reviewed  Airway: Mallampati: II  TM distance: >3 FB   Neck ROM: full  Mouth opening: > = 3 FB   Dental: normal exam         Pulmonary:Negative Pulmonary ROS and normal exam                               Cardiovascular:Negative CV ROS                      Neuro/Psych:   Negative Neuro/Psych ROS              GI/Hepatic/Renal:   (+) morbid obesity          Endo/Other:                      ROS comment: -MARIJUANA USE  -NPO AFTER MIDNIGHT  -ALLERGIES - PCN, AMOXICILLIN Abdominal: normal exam            Vascular: negative vascular ROS.         Other Findings:       Anesthesia Plan      general and regional     ASA 2     (LMA; SUPRACLAVICULAR BLOCK)  Induction: intravenous.    MIPS: Postoperative opioids intended and Prophylactic antiemetics administered.  Anesthetic plan and risks discussed with patient.      Plan discussed with CRNA.    Attending anesthesiologist reviewed and agrees with Preprocedure content      Post-op pain plan if not by surgeon: single peripheral nerve block and regional        AURELIANO FRANCO MD   5/31/2024

## 2024-05-31 NOTE — ANESTHESIA POSTPROCEDURE EVALUATION
Department of Anesthesiology  Postprocedure Note    Patient: Mahendra Mabry  MRN: [id number]  YOB: 1990  Date of evaluation: 5/31/2024    Procedure Summary       Date: 05/31/24 Room / Location: [address]    Anesthesia Start: 1117 Anesthesia Stop: 1301    Procedure: LEFT DISTAL RADIUS OPEN REDUCTION INTERNAL FIXATION WITH SYNTHES (Left) Diagnosis:       Closed fracture of distal end of left radius, unspecified fracture morphology, initial encounter      (Closed fracture of distal end of left radius, unspecified fracture morphology, initial encounter [S52.502A])    Surgeons: Alejandro Shelley MD Responsible Provider: Javier Mora MD    Anesthesia Type: general, regional ASA Status: 2            Anesthesia Type: No value filed.    Imelda Phase I: Imelda Score: 9    Imelda Phase II: Imelda Score: 9    Anesthesia Post Evaluation    Patient location during evaluation: PACU  Patient participation: complete - patient participated  Level of consciousness: awake and awake and alert  Pain score: 0  Nausea & Vomiting: no nausea and no vomiting  Cardiovascular status: hemodynamically stable and blood pressure returned to baseline  Respiratory status: acceptable  Hydration status: euvolemic  Multimodal analgesia pain management approach  Pain management: adequate    No notable events documented.

## 2024-05-31 NOTE — H&P
ORTHOPEDIC PREOP HISTORY AND PHYSICAL      HPI / Chief Complaint  Mahendra Mabry is a 34 y.o. male who presents for left distal radius fracture    Past Medical History  Mahendra  has no past medical history on file.    Past Surgical History  Mahendra  has a past surgical history that includes Arm Surgery (Right).    Current Medications  Current Facility-Administered Medications   Medication Dose Route Frequency Provider Last Rate Last Admin    lidocaine PF 1 % injection 1 mL  1 mL IntraDERmal Once PRN Javier Mora MD        lactated ringers IV soln infusion   IntraVENous Continuous Javier Mora MD        sodium chloride flush 0.9 % injection 5-40 mL  5-40 mL IntraVENous 2 times per day Javier Mora MD        sodium chloride flush 0.9 % injection 5-40 mL  5-40 mL IntraVENous PRN Javier Mora MD        0.9 % sodium chloride infusion   IntraVENous PRN Javier Mora MD        ceFAZolin (ANCEF) 3000 mg in sodium chloride 0.9% 100 mL IVPB  3,000 mg IntraVENous On Call to OR Shanika Figueroa APRN - CNP        dexAMETHasone (PF) (DECADRON) injection 10 mg  10 mg IntraVENous Once Shanika Figueroa APRN - CNP        sodium chloride flush 0.9 % injection 5-40 mL  5-40 mL IntraVENous 2 times per day Shanika Figueroa APRN - CNP        sodium chloride flush 0.9 % injection 5-40 mL  5-40 mL IntraVENous PRN Shanika Figueroa APRN - CNP        fentaNYL (SUBLIMAZE) injection 100 mcg  100 mcg IntraVENous Once Javier Mora MD        midazolam PF (VERSED) injection 2 mg  2 mg IntraVENous Once Javier Mora MD        midazolam (VERSED) 2 MG/2ML injection             fentaNYL (SUBLIMAZE) 100 MCG/2ML injection                Allergies  Allergies have been reviewed.  Mahendra is allergic to penicillins and amoxicillin.    Social History  Mahendra  reports that he has never smoked. He has never used smokeless tobacco. He reports current drug use. Drug: Marijuana (Weed).    Family History  Mahendra's family history is not on  file.      Review of Systems   History obtained from the patient.   REVIEW OF SYSTEMS:   Constitution: negative for fever, chills    Physical Exam  BP (!) 153/94   Pulse 100   Temp 96.9 °F (36.1 °C) (Infrared)   Resp 18   Ht 1.829 m (6')   Wt 129.7 kg (286 lb)   SpO2 96%   BMI 38.79 kg/m²    General Appearance: in no distress  HEENT: Normocephalic  CV: brisk cap refill to extremities  Lungs: Nonlabored breathing  Abdomen: soft  Extremities: Left wrist skin intact    Assessment and Plan  Mahendra Mabry is a 34 y.o. old male who has a left distal radius fracture going on to malunion.  Plan is for left distal radius fracture ORIF.    This note is created with the assistance of a speech recognition program.  While intending to generate a document that actually reflects the content of the visit, the document can still have some errors including those of syntax and sound a like substitutions which may escape proof reading.  It such instances, actual meaning can be extrapolated by contextual diversion.

## 2024-05-31 NOTE — DISCHARGE INSTRUCTIONS
Leave the splint/dressing on.  Do not remove it.  Do not get wet.  It is okay to wiggle the fingers and to move the elbow.  Keep the hand/fingers elevated and pointed towards the ceiling as much as possible to help reduce swelling in the hand.  You can remove the sling as soon as you feel comfortable.    Call your doctor now or seek immediate medical care if:     You have pain that does not get better after you take pain medicine.   You have a fever over 101°F.   You have chills   You have signs of infection, such as:   Increased pain, swelling, warmth, or redness.   Red streaks leading from the incision.   Pus draining from the incision.   Activity  You have had anesthesia today  Do not drive, operate heavy equipment, consume alcoholic beverages, or make any important decisions  for 24 hours   If you are taking pain medication: Do not drive or consume alcohol.  Take your time changing positions today. You may feel light headed or dizzy if you move too quickly.   Continue your home medications as ordered by your physician.  Diet   You can eat your normal diet when you feel well. You should start off with bland foods like chicken soup, toast, or yogurt. Then advance as tolerated.  Drink plenty of fluids (unless your doctor tells you not to). Your urine should be very lightly colored without a strong odor.

## 2024-06-13 ENCOUNTER — OFFICE VISIT (OUTPATIENT)
Age: 34
End: 2024-06-13

## 2024-06-13 VITALS — BODY MASS INDEX: 38.74 KG/M2 | HEIGHT: 72 IN | WEIGHT: 286 LBS

## 2024-06-13 DIAGNOSIS — S52.502A CLOSED FRACTURE OF DISTAL END OF LEFT RADIUS, UNSPECIFIED FRACTURE MORPHOLOGY, INITIAL ENCOUNTER: ICD-10-CM

## 2024-06-13 DIAGNOSIS — M25.532 LEFT WRIST PAIN: Primary | ICD-10-CM

## 2024-06-13 PROCEDURE — 99024 POSTOP FOLLOW-UP VISIT: CPT | Performed by: ORTHOPAEDIC SURGERY

## 2024-06-13 NOTE — PROGRESS NOTES
[facility] Orthopedics & Sports Medicine      [facility] PHYSICIANS [facility]  [facility]OPA[facility] SPORTS MEDICINE  [address]  [place] [address]  Dept: [phone]  Dept Fax: [phone]    Chief Compliant:  Chief Complaint   Patient presents with    Post-Op Check     1st post op - L distal radius ORIF        History of Present Illness:  This is a pleasant 34 y.o. male who is now 2-week status post left distal radius fracture ORIF.  He is doing well.  No complaints    Physical Exam: The left wrist incision is well-healed.  He is swelling ecchymosis as expected.  Almost able to make a full fist.  Good extension of the digits.    Imaging: X-rays of the left wrist 3 views taken today show good alignment of the fracture and no significant change in alignment.  There is been no hardware failure.      Assessment and Plan:    This is a pleasant 34 y.o. male who is status post the above.  Is doing well.  I will start him in occupational therapy.  Brace was applied.  I will see him in 4-week         Past History:    Current Outpatient Medications:     ibuprofen (ADVIL;MOTRIN) 800 MG tablet, Take 1 tablet by mouth 3 times daily (with meals), Disp: 90 tablet, Rfl: 0    escitalopram (LEXAPRO) 10 MG tablet, Take one tablet daily along with 5 mg daily to equal 15 mg daily, Disp: , Rfl:     ibuprofen (ADVIL;MOTRIN) 800 MG tablet, Take 1 tablet by mouth every 8 hours as needed, Disp: , Rfl:   Allergies   Allergen Reactions    Penicillins Anaphylaxis    Amoxicillin Rash     Social History     Socioeconomic History    Marital status: Single     Spouse name: Not on file    Number of children: Not on file    Years of education: Not on file    Highest education level: Not on file   Occupational History    Not on file   Tobacco Use    Smoking status: Never    Smokeless tobacco: Never   Substance and Sexual Activity    Alcohol use: Not on file    Drug use: Yes     Types: Marijuana

## 2024-06-17 ENCOUNTER — HOSPITAL ENCOUNTER (OUTPATIENT)
Dept: OCCUPATIONAL THERAPY | Facility: CLINIC | Age: 34
Setting detail: THERAPIES SERIES
Discharge: HOME | End: 2024-06-17
Payer: COMMERCIAL

## 2024-06-17 PROCEDURE — 97165 OT EVAL LOW COMPLEX 30 MIN: CPT

## 2024-06-17 PROCEDURE — 97110 THERAPEUTIC EXERCISES: CPT

## 2024-06-17 NOTE — CONSULTS
HOMA  The washing machine is on and the main level    Vocation      Job Status [x]Normal duty []Light duty [] Off due to condition []Retired  []Not employed []Disability  []Other:  []  Return to work:   Work activities/duties      Avocational Activities     [x] Young Children     [] Grandparenting     [] Care giver [] OTHER    [] NA       ADL/IADL Previous level of function Current level of function Who assists or modifications made or needed   Bathing  [x] Independent    [] Assist  [x] Independent    [] Assist     Dress/grooming [x] Independent    [] Assist  [x] Independent    [] Assist     Transfer/mobility [x] Independent    [] Assist  [x] Independent    [] Assist     Feeding [x] Independent    [] Assist  [x] Independent    [] Assist     Toileting [x] Independent    [] Assist  [x] Independent    [] Assist     Driving [x] Independent    [] Assist  [x] Independent    [] Assist     Housekeeping [x] Independent    [] Assist  [x] Independent    [] Assist     Grocery shop/meal prep [x] Independent    [] Assist  [x] Independent    [] Assist       Device: Independent   Orthosis: Currently has and Type pre-elias wrist brace    Objective: Tests/Measurements: Upper Extremity Functional Index  Current Functional Level:  60/80 functionally impaired as measured with the Upper Extremity Functional Index Survey.  0-80 scale, with 80 = no Deficits  (The UEFI model does not provide any specific cut off points that could classify the upper limb disability degree, however, a minimal detectable change of 9 points is provided.This means that for improvement or deterioration to be considered, between two subsequent evaluations, the scores must differ by at least 9 points.)    Sensibility: Normal Edema: Min      Skin Color: Bruised Skin/Scar condition Intact and Scar Firm    Edema   Right Left   Wrist Joint 19.5 21.2     UE ROM/MMT   Right Left MMT Right MMT Left   Elbow       Forearm Pronation (80-90) 90 90 5/5 NT   Forearm

## 2024-06-20 ENCOUNTER — HOSPITAL ENCOUNTER (OUTPATIENT)
Dept: OCCUPATIONAL THERAPY | Facility: CLINIC | Age: 34
Setting detail: THERAPIES SERIES
Discharge: HOME | End: 2024-06-20
Payer: COMMERCIAL

## 2024-06-20 PROCEDURE — 97140 MANUAL THERAPY 1/> REGIONS: CPT

## 2024-06-20 PROCEDURE — 97110 THERAPEUTIC EXERCISES: CPT

## 2024-06-20 NOTE — FLOWSHEET NOTE
[] [facility]       Occupational Therapy            1st floor       [address] [place], [place]         Phone: [phone]       Fax: [phone] [x] [facility] Hand Rehab   [facility]  [address] [place], [place]  Phone: [phone]  Fax: [phone] [] [facility]  Outpatient Rehabilitation &  Therapy  [address]   P: [phone]  F: [phone]     Occupational Therapy Daily Treatment Note    Date:  2024  Patient Name:  Mahendra Mabry    :  1990  MRN: [id number]  Referring Provider:   Alejandro Shelley MD  [facility] -  visits (HARD [place], Combined ST/PT/OT/Pulm)  Medical Diagnosis: M25.532 (ICD-10-CM) - Left wrist pain, S52.502A (ICD-10-CM) - Closed fracture of distal end of left radius, unspecified fracture morphology, initial encounter                    Rehab Codes: stiffness in wrist M25.63, or effusion of wrist M25.43,  Onset Date: 24                   Next  Appt: 24  Visit# / total visits: ; Progress note for Medicare patient due at visit 8    Cancels/No Shows: 0/0      Subjective:    Pain:  [] Yes  [x] No Location:  N/A Pain Rating: (0-10 scale) 0/10  Pain altered Tx:  [x] No  [] Yes  Action:  Pt Comments: Pt reports he is doing well,      Objective:  Modalities:  Precautions:  Exercises:  Exercise Reps/Time Weight/Level Comments Completed    Wrist AROM  3x10 AROM Flex/ext  Radial/ulnar X   Pronation/supination  3x10 AROM  X   Prayer stretch  15x5 sec PROM  X   Flexion overpressor [id number] PROM  X    wrist maze 5 AROM   X   Foam cube transfer 2 series      X   Other: Access Code: [email or web address]  URL: [email or web address]  Date: 2024  Prepared by: Karlo Valencia      Treatment Charges: Mins Units   []  Modalities:      []  Ultrasound     []  Ther Exercise 20 1   [x]  Manual Therapy 15 1   []  Ther Activities     []  Orthotic fit/train     []  Orthotic recheck     []  ADL training     []  Other

## 2024-06-24 ENCOUNTER — HOSPITAL ENCOUNTER (OUTPATIENT)
Dept: OCCUPATIONAL THERAPY | Facility: CLINIC | Age: 34
Setting detail: THERAPIES SERIES
Discharge: HOME | End: 2024-06-24
Payer: COMMERCIAL

## 2024-06-24 PROCEDURE — 97110 THERAPEUTIC EXERCISES: CPT

## 2024-06-24 PROCEDURE — 97140 MANUAL THERAPY 1/> REGIONS: CPT

## 2024-06-24 NOTE — FLOWSHEET NOTE
[] [facility]       Occupational Therapy            1st floor       [address] [place], [place]         Phone: [phone]       Fax: [phone] [x] [facility] Hand Rehab   [facility]  [address] [place], [place]  Phone: [phone]  Fax: [phone] [] [facility]  Outpatient Rehabilitation &  Therapy  [address]   P: [phone]  F: [phone]     Occupational Therapy Daily Treatment Note    Date:  2024  Patient Name:  Mahendra Mabry    :  1990  MRN: [id number]  Referring Provider:   Alejandro Shelley MD  Insurance: [facility] -  visits (HARD [place], Combined ST/PT/OT/Pulm)  Medical Diagnosis: M25.532 (ICD-10-CM) - Left wrist pain, S52.502A (ICD-10-CM) - Closed fracture of distal end of left radius, unspecified fracture morphology, initial encounter                    Rehab Codes: stiffness in wrist M25.63, or effusion of wrist M25.43,  Onset Date: 24                   Next  Appt: 24  Visit# / total visits: 3/16; Progress note for Medicare patient due at visit 8    Cancels/No Shows: 0/0      Subjective:    Pain:  [] Yes  [x] No Location:  N/A Pain Rating: (0-10 scale) 0/10  Pain altered Tx:  [x] No  [] Yes  Action:  Pt Comments: Pt reports every day he can do more,      Objective:  Modalities:  Precautions:  Exercises:  Exercise Reps/Time Weight/Level Comments Completed    Wrist AROM  3x10 AROM Flex/ext  Radial/ulnar X   Pronation/supination  3x10 AROM  X   Prayer stretch  15x5 sec PROM  X   Flexion overpressor [id number] PROM  X    wrist maze 5 AROM   X   Foam cube transfer 2 series      X   Tennis ball  Abc's  Massage    1x  Added   X  X   FMC 1/2 marbles In hand  added X   Other: Access Code: [email or web address]  URL: [email or web address]  Date: 2024  Prepared by: Karlo Valencia      Treatment Charges: Mins Units   []  Modalities:      []  Ultrasound     [x]  Ther Exercise 34 2   [x]  Manual Therapy 15 1   []  Ther Activities

## 2024-06-27 ENCOUNTER — HOSPITAL ENCOUNTER (OUTPATIENT)
Dept: OCCUPATIONAL THERAPY | Facility: CLINIC | Age: 34
Setting detail: THERAPIES SERIES
Discharge: HOME | End: 2024-06-27
Payer: COMMERCIAL

## 2024-06-27 PROCEDURE — 97110 THERAPEUTIC EXERCISES: CPT

## 2024-06-27 PROCEDURE — 97140 MANUAL THERAPY 1/> REGIONS: CPT

## 2024-06-27 NOTE — FLOWSHEET NOTE
demonstrated by performance with correct form without cues.      Pt. Education:  [] Yes  [x] No  [] Reviewed Prior HEP/Ed  Method of Education: [] Verbal  [] Demo  [] Written  Re:  HEP, Scar massage and edema management.  Comprehension of Education:  [] Verbalizes understanding.  [] Demonstrates understanding.  [] Needs review.  [] Demonstrates/verbalizes HEP/Ed previously given.      Plan: [x] Continue current frequency toward short and long term goals.  [x] Specific Instructions for subsequent treatments: Cont AROM/PROM, begin weighted stretches    [] Other:       Time In: 0800  Time Out: 0839        Electronically signed by:  Karlo Valencia, OTR/L      .

## 2024-07-01 ENCOUNTER — HOSPITAL ENCOUNTER (OUTPATIENT)
Dept: OCCUPATIONAL THERAPY | Facility: CLINIC | Age: 34
Setting detail: THERAPIES SERIES
Discharge: HOME | End: 2024-07-01
Payer: COMMERCIAL

## 2024-07-01 PROCEDURE — 97110 THERAPEUTIC EXERCISES: CPT

## 2024-07-01 PROCEDURE — 97140 MANUAL THERAPY 1/> REGIONS: CPT

## 2024-07-01 NOTE — FLOWSHEET NOTE
[] [facility]       Occupational Therapy            1st floor       [address] [place], [place]         Phone: [phone]       Fax: [phone] [x] [facility] Hand Rehab   [facility]  [address] [place], [place]  Phone: [phone]  Fax: [phone] [] [facility]  Outpatient Rehabilitation &  Therapy  [address]   P: [phone]  F: [phone]     Occupational Therapy Daily Treatment Note    Date:  2024  Patient Name:  Mahendra Mabry    :  1990  MRN: [id number]  Referring Provider:   Alejandro Shelley MD  Insurance: [facility] -  visits (HARD [place], Combined ST/PT/OT/Pulm)  Medical Diagnosis: M25.532 (ICD-10-CM) - Left wrist pain, S52.502A (ICD-10-CM) - Closed fracture of distal end of left radius, unspecified fracture morphology, initial encounter                    Rehab Codes: stiffness in wrist M25.63, or effusion of wrist M25.43,  Onset Date: 24                   Next  Appt: 24  Visit# / total visits: ; Progress note for Medicare patient due at visit 8    Cancels/No Shows: 0/0      Subjective:    Pain:  [] Yes  [x] No Location:  N/A Pain Rating: (0-10 scale) 0/10  Pain altered Tx:  [x] No  [] Yes  Action:  Pt Comments: Pt reports he has a snack that reminds him of therapy - eating popcorn, and that is getting easier.    Objective:  Modalities:  Precautions:NWB LUE   Exercises:  Exercise Reps/Time Weight/Level Comments Completed    Wrist AROM  3x10 AROM Flex/ext  Radial/ulnar X   Pronation/supination  3x10 AROM  X   Prayer stretch  15x5 sec PROM  X   Flexion overpressor [id number] PROM  X    wrist maze 5 AROM   X   Foam cube transfer 2 series      -   Tennis ball  Abc's  Massage    1x  Added -   FMC 1x In hand  added x   Power web 3x10 yellow  X   putty 3 mins tan  X   Digital extension 3x10 red  X   Other: Access Code: [email or web address]  URL: [email or web address]  Date: 2024  Prepared by: Karlo Darnell

## 2024-07-03 ENCOUNTER — HOSPITAL ENCOUNTER (OUTPATIENT)
Dept: OCCUPATIONAL THERAPY | Facility: CLINIC | Age: 34
Setting detail: THERAPIES SERIES
Discharge: HOME | End: 2024-07-03
Payer: COMMERCIAL

## 2024-07-03 PROCEDURE — 97110 THERAPEUTIC EXERCISES: CPT

## 2024-07-03 PROCEDURE — 97140 MANUAL THERAPY 1/> REGIONS: CPT

## 2024-07-03 NOTE — FLOWSHEET NOTE
more points to promote increased functional abilities  Scar will be soft and pliable with minimal tethering.  Decrease Edema in L wrist by 1cm  Patient to be independent with home exercise program as demonstrated by performance with correct form without cues.      Pt. Education:  [] Yes  [x] No  [] Reviewed Prior HEP/Ed  Method of Education: [] Verbal  [] Demo  [] Written  Re:    Comprehension of Education:  [] Verbalizes understanding.  [] Demonstrates understanding.  [] Needs review.  [] Demonstrates/verbalizes HEP/Ed previously given.      Plan: [x] Continue current frequency toward short and long term goals.  [x] Specific Instructions for subsequent treatments: Cont AROM/PROM, continue weighted stretches    [] Other:       Time In: 0806  Time Out: 0850        Electronically signed by:  KIANNA Koenig/ALENA

## 2024-07-08 ENCOUNTER — HOSPITAL ENCOUNTER (OUTPATIENT)
Dept: OCCUPATIONAL THERAPY | Facility: CLINIC | Age: 34
Setting detail: THERAPIES SERIES
Discharge: HOME | End: 2024-07-08
Payer: COMMERCIAL

## 2024-07-08 PROCEDURE — 97140 MANUAL THERAPY 1/> REGIONS: CPT

## 2024-07-08 PROCEDURE — 97110 THERAPEUTIC EXERCISES: CPT

## 2024-07-08 NOTE — FLOWSHEET NOTE
[] [facility]       Occupational Therapy            1st floor       [address] [place], [place]         Phone: [phone]       Fax: [phone] [x] [facility] Hand Rehab   [facility]  [address] [place], [place]  Phone: [phone]  Fax: [phone] [] [facility]  Outpatient Rehabilitation &  Therapy  [address]   P: [phone]  F: [phone]     Occupational Therapy Daily Treatment Note    Date:  2024  Patient Name:  Mahendra Mabry    :  1990  MRN: [id number]  Referring Provider:   Alejandro Shelley MD  Insurance: [employer] -  visits (HARD [place], Combined ST/PT/OT/Pulm)  Medical Diagnosis: M25.532 (ICD-10-CM) - Left wrist pain, S52.502A (ICD-10-CM) - Closed fracture of distal end of left radius, unspecified fracture morphology, initial encounter                    Rehab Codes: stiffness in wrist M25.63, or effusion of wrist M25.43,  Onset Date: 24                   Next  Appt: 24  Visit# / total visits: ; Progress note for Medicare patient due at visit 8    Cancels/No Shows: 0/0      Subjective:    Pain:  [] Yes  [x] No Location:  N/A Pain Rating: (0-10 scale) 0/10  Pain altered Tx:  [x] No  [] Yes  Action:  Pt Comments: Pt reports L wrist is doing very well. He is not having issues at home.     Objective:  Modalities:  Precautions: NWB LUE   Exercises:  Exercise Reps/Time Weight/Level Comments Completed    Wrist AROM  3x10 AROM Flex/ext  Radial/ulnar -   Pronation/supination  3x10 AROM  -   Prayer stretch  15x5 sec PROM  X   Flexion overpressor [id number] PROM  X    wrist maze 5 AROM   X   Foam cube transfer 2 series      -   FMC 1x In hand   -   Power web 3x10 Red  X   putty 3 mins Yellow  X   Digital extension 3x10 red  X   Metal gripper 3x10 35lbs  X   Pronation/supination 2lbs   X   Flexbar  Wrist Flexion/Ext  Wrist pronation  Wrist supination  Wrist ulnar deviation  Wrist radial dev 2x10 Red  X   Pronated wrist curl 2x10

## 2024-07-11 ENCOUNTER — OFFICE VISIT (OUTPATIENT)
Age: 34
End: 2024-07-11

## 2024-07-11 VITALS — WEIGHT: 286 LBS | BODY MASS INDEX: 38.74 KG/M2 | HEIGHT: 72 IN

## 2024-07-11 DIAGNOSIS — S52.502A CLOSED FRACTURE OF DISTAL END OF LEFT RADIUS, UNSPECIFIED FRACTURE MORPHOLOGY, INITIAL ENCOUNTER: ICD-10-CM

## 2024-07-11 DIAGNOSIS — M25.532 LEFT WRIST PAIN: Primary | ICD-10-CM

## 2024-07-11 PROCEDURE — 99024 POSTOP FOLLOW-UP VISIT: CPT | Performed by: ORTHOPAEDIC SURGERY

## 2024-07-11 NOTE — PROGRESS NOTES
[facility]ics & [facility] Medicine      [facility] PHYSICIANS [facility]  [facility] ORTHOPAEDICS AND SPORTS MEDICINE  [address]  [place] [address]  Dept: [phone]  Dept Fax: [phone]    Chief Compliant:  Chief Complaint   Patient presents with    Follow-up        History of Present Illness:  This is a pleasant 34 y.o. male who is 6 weeks status post left distal radius fracture ORIF.  He is doing very well.  He is in therapy and progressing.    Physical Exam: The left wrist incision is well-healed.  He has near full range of motion of the wrist more than I would expect.  He has full range of motion of the digits.    Imaging: X-rays of the left wrist 2 views taken today show excellent healing of the fracture.  There is been no displacement or hardware failure.      Assessment and Plan:    This is a pleasant 34 y.o. male who is status post the above.  He is doing excellent.  He is recovering faster than expected.  He can start increasing his activity as tolerated and follow-up as needed.  He can wean from the brace.         Past History:    Current Outpatient Medications:     ibuprofen (ADVIL;MOTRIN) 800 MG tablet, Take 1 tablet by mouth 3 times daily (with meals), Disp: 90 tablet, Rfl: 0    escitalopram (LEXAPRO) 10 MG tablet, Take one tablet daily along with 5 mg daily to equal 15 mg daily, Disp: , Rfl:     ibuprofen (ADVIL;MOTRIN) 800 MG tablet, Take 1 tablet by mouth every 8 hours as needed, Disp: , Rfl:   Allergies   Allergen Reactions    Penicillins Anaphylaxis    Amoxicillin Rash     Social History     Socioeconomic History    Marital status: Single     Spouse name: Not on file    Number of children: Not on file    Years of education: Not on file    Highest education level: Not on file   Occupational History    Not on file   Tobacco Use    Smoking status: Never    Smokeless tobacco: Never   Substance and Sexual Activity    Alcohol use: Not on file    Drug

## 2024-07-15 ENCOUNTER — HOSPITAL ENCOUNTER (OUTPATIENT)
Dept: OCCUPATIONAL THERAPY | Facility: CLINIC | Age: 34
Setting detail: THERAPIES SERIES
Discharge: HOME | End: 2024-07-15
Payer: COMMERCIAL

## 2024-07-15 PROCEDURE — 97110 THERAPEUTIC EXERCISES: CPT

## 2024-07-15 PROCEDURE — 97140 MANUAL THERAPY 1/> REGIONS: CPT

## 2024-07-15 NOTE — PROGRESS NOTES
Signature:________________________________Date:__________________  By signing above or cosigning this note, I have reviewed this plan of care and certify a need for medically necessary rehabilitation services.     *PLEASE SIGN ABOVE AND FAX BACK ALL PAGES

## 2024-07-15 NOTE — FLOWSHEET NOTE
[] [facility]       Occupational Therapy            1st floor       [address], [place]         Phone: [phone]       Fax: [phone] [x] [facility]   [facility]  [address] [place], [place]  Phone: [phone]  Fax: [phone] [] [facility]  Outpatient Rehabilitation &  Therapy  [address]   P: [phone]  F: [phone]     Occupational Therapy Daily Treatment Note    Date:  7/15/2024  Patient Name:  Mahendra Mabry    :  1990  MRN: [id number]  Referring Provider:   Alejandro Shelley MD  Insurance: [employer]  visits (HARD [place], Combined ST/PT/OT/Pulm)  Medical Diagnosis: M25.532 (ICD-10-CM) - Left wrist pain, S52.502A (ICD-10-CM) - Closed fracture of distal end of left radius, unspecified fracture morphology, initial encounter                    Rehab Codes: stiffness in wrist M25.63, or effusion of wrist M25.43,  Onset Date: 24                   Next  Appt: 24  Visit# / total visits: ; Progress note for Medicare patient due at visit 8    Cancels/No Shows: 0/0      Subjective:    Pain:  [] Yes  [x] No Location:  N/A Pain Rating: (0-10 scale) 0/10  Pain altered Tx:  [x] No  [] Yes  Action:  Pt Comments: Pt reports L wrist is a little more sore today. Was release from MD, no restrictions except for pull-ups/push-ups    Objective:  Modalities:  Precautions: NWB LUE   Exercises:  Exercise Reps/Time Weight/Level Comments Completed    Wrist AROM  3x10 AROM Flex/ext  Radial/ulnar -   Pronation/supination  3x10 AROM  -   Prayer stretch  15x5 sec PROM  X   Flexion overpressor [id number] PROM  X    wrist maze 5 AROM   X   Foam cube transfer 2 series      -   FMC 1x In hand   -   Power web 3x10 Red  X   putty 3 mins Green  X   Digital extension 3x10 Green  X   Metal gripper 3x10 35lbs  X   Pronation/supination 2x10 3lbs  X   Flexbar  Wrist Flexion/Ext  Wrist pronation  Wrist supination  Wrist ulnar deviation  Wrist

## 2024-07-22 ENCOUNTER — HOSPITAL ENCOUNTER (OUTPATIENT)
Dept: OCCUPATIONAL THERAPY | Facility: CLINIC | Age: 34
Setting detail: THERAPIES SERIES
Discharge: HOME | End: 2024-07-22
Payer: COMMERCIAL

## 2024-07-22 PROCEDURE — 97110 THERAPEUTIC EXERCISES: CPT

## 2024-07-22 NOTE — FLOWSHEET NOTE
bearing 15x5 sec   X   Vertical DB rotation 2x5 2lbs Clock/counterclockwise X   Other: Access Code: [email or web address]  URL: [email or web address]  Date: 06/17/2024  Prepared by: Karlo Valencia      Treatment Charges: Mins Units   []  Modalities:      []  Ultrasound     [x]  Ther Exercise 34 2   []  Manual Therapy     []  Ther Activities     []  Orthotic fit/train     []  Orthotic recheck     []  ADL training     []  Other     Total Billable time 34 mins      Assessment: [x] Progressing toward goals. Cont PROM to the L wrist into flex/ext. Upgraded hand strengthening for the R UE. Introduced weight bearing to the L wrist. Pt tolerated tx well on this date.     [] No change.  [] Other     [x] Patient would continue to benefit from skilled occupational therapy services in order to: Improve  ROM and Strength in order to improve functional use of UE in ADL performance, decrease pain in UE for safe completion of ADLs, and return to PLOF      STG/LTG  Short Term Goals: (  8    Treatments)  Increase AROM in the L wrist by 20 degrees into flexion/ext MET  Increase AROM in the L wrist by 10 degrees into radial/ulnar dev MET  Increase AROM supination in the L UE by 20 degrees  MET  Increase  strength in L UE by 10lbs (set 7/15/24)  Increase pinch strength in L UE by 2lbs (set 7/15/24)  Increase function:UE Functional Index Score to 70/80 or more points to promote increased functional abilities MET  Scar will be soft and pliable with minimal tethering. MET  Decrease Edema in L wrist by .5cm MET  Patient to be independent with home exercise program as demonstrated by performance with correct form without cues. MET     Long Term Goals: (  16  Treatments)  Increase AROM in the L wrist by 30 degrees into flexion/ext  Increase AROM in the L wrist by 15 degrees into radial/ulnar dev  Increase AROM supination in the L UE by 25 degrees   Increase  strength in L UE: will be set when appropriate   Increase pinch strength in L UE:

## 2024-07-25 ENCOUNTER — HOSPITAL ENCOUNTER (OUTPATIENT)
Dept: OCCUPATIONAL THERAPY | Facility: CLINIC | Age: 34
Setting detail: THERAPIES SERIES
Discharge: HOME | End: 2024-07-25
Payer: COMMERCIAL

## 2024-07-25 PROCEDURE — 97110 THERAPEUTIC EXERCISES: CPT

## 2024-07-25 NOTE — FLOWSHEET NOTE
[] [facility]       Occupational Therapy            1st floor       [address] [place], [place]         Phone: [phone]       Fax: [phone] [x] [facility] Hand Re[facility]   [facility]  [address] [place], [place]  Phone: [phone]  Fax: [phone] [] [facility]  Outpatient Rehabilitation &  Therapy  [address]   P: [phone]  F: [phone]     Occupational Therapy Daily Treatment Note    Date:  2024  Patient Name:  Mahendra Mabry    :  1990  MRN: [id number]  Referring Provider:   Alejandro Shelley MD  Insurance: [facility]  visits (HARD [place], Combined ST/PT/OT/Pulm)  Medical Diagnosis: M25.532 (ICD-10-CM) - Left wrist pain, S52.502A (ICD-10-CM) - Closed fracture of distal end of left radius, unspecified fracture morphology, initial encounter                    Rehab Codes: stiffness in wrist M25.63, or effusion of wrist M25.43,  Onset Date: 24                   Next  Appt: TBD  Visit# / total visits: 10/16; Progress note for Medicare patient due at visit 8    Cancels/No Shows: 0/0      Subjective:    Pain:  [] Yes  [x] No Location:  N/A Pain Rating: (0-10 scale) 0/10  Pain altered Tx:  [x] No  [] Yes  Action:  Pt Comments: Pt reports L wrist is feeling pretty normal     Objective:  Modalities:  Precautions: NWB LUE   Exercises:  Exercise Reps/Time Weight/Level Comments Completed    Power web 3x10 Red    Twist X   putty 3 mins Green  X   Digital extension 3x10 Green  -   Metal gripper 3x10 50lbs  X   Pronation/supination 2x10 3lbs  X   Flexbar  Wrist Flexion/Ext  Wrist pronation  Wrist supination  Wrist ulnar deviation  Wrist radial dev 2x10 Green  X   Pronated wrist curl 2x10 1kg Red ball X   Wrist roll 5 3lbs  X   Ball toll and grab 25 1kg  X   Weight bearing 3x8  Table push-ups X   Vertical DB rotation 2x5 2lbs Clock/counterclockwise X   3 way wrist curl 3x10 3lbs  X   Other: Access Code: [email or web address]  URL:

## 2024-07-29 ENCOUNTER — HOSPITAL ENCOUNTER (OUTPATIENT)
Dept: OCCUPATIONAL THERAPY | Facility: CLINIC | Age: 34
Setting detail: THERAPIES SERIES
Discharge: HOME | End: 2024-07-29
Payer: COMMERCIAL

## 2024-07-29 PROCEDURE — 97110 THERAPEUTIC EXERCISES: CPT

## 2024-07-29 NOTE — FLOWSHEET NOTE
[] [facility]       Occupational Therapy            1st floor       [address] [place], [place]         Phone: [phone]       Fax: [phone] [x] [facility] Hand Rehab   [facility]  [address] [place], [place]  Phone: [phone]  Fax: [phone] [] [facility]  Outpatient Rehabilitation &  Therapy  [address]   P: [phone]  F: [phone]     Occupational Therapy Daily Treatment Note    Date:  2024  Patient Name:  Mahendra Mabry    :  1990  MRN: [id number]  Referring Provider:   Alejandro Shelley MD  Insurance: [facility]  visits (HARD [place], Combined ST/PT/OT/Pulm)  Medical Diagnosis: M25.532 (ICD-10-CM) - Left wrist pain, S52.502A (ICD-10-CM) - Closed fracture of distal end of left radius, unspecified fracture morphology, initial encounter                    Rehab Codes: stiffness in wrist M25.63, or effusion of wrist M25.43,  Onset Date: 24                   Next  Appt: TBD  Visit# / total visits: ; Progress note for Medicare patient due at visit 8    Cancels/No Shows: 0/0      Subjective:    Pain:  [] Yes  [x] No Location:  N/A Pain Rating: (0-10 scale) 0/10  Pain altered Tx:  [x] No  [] Yes  Action:  Pt Comments: Pt reports L wrist is doing well.     Objective:  Modalities:  Precautions: NWB LUE   Exercises:  Exercise Reps/Time Weight/Level Comments Completed    Power web 3x10 Red    Twist X   putty 3 mins Green  X   Digital extension 3x10 Green  -   Metal gripper 3x10 50lbs  X   Did-flex 3x10 Blue  X   Pronation/supination 2x10 3lbs  X   Flexbar  Wrist Flexion/Ext  Wrist pronation  Wrist supination  Wrist ulnar deviation  Wrist radial dev 2x10 Green  X   Pronated wrist curl 2x10 1kg Red ball X   Wrist roll 5 3lbs  X   Ball toss and grab 25 1kg  X   Weight bearing 3x8  Table push-ups X   Vertical DB rotation 2x5 2lbs Clock/counterclockwise X   3 way wrist curl 3x10 3lbs  X   Other: Access Code: [email or web address]  URL:

## 2025-08-05 ENCOUNTER — OFFICE VISIT (OUTPATIENT)
Dept: FAMILY MEDICINE CLINIC | Age: 35
End: 2025-08-05
Payer: COMMERCIAL

## 2025-08-05 VITALS
HEIGHT: 71 IN | WEIGHT: 200.8 LBS | SYSTOLIC BLOOD PRESSURE: 130 MMHG | BODY MASS INDEX: 28.11 KG/M2 | OXYGEN SATURATION: 98 % | DIASTOLIC BLOOD PRESSURE: 80 MMHG | HEART RATE: 89 BPM

## 2025-08-05 DIAGNOSIS — Z13.1 SCREENING FOR DIABETES MELLITUS: ICD-10-CM

## 2025-08-05 DIAGNOSIS — Z30.09 VASECTOMY EVALUATION: ICD-10-CM

## 2025-08-05 DIAGNOSIS — Z76.89 ENCOUNTER TO ESTABLISH CARE: Primary | ICD-10-CM

## 2025-08-05 DIAGNOSIS — R74.8 ELEVATED LIVER ENZYMES: ICD-10-CM

## 2025-08-05 DIAGNOSIS — F43.22 ADJUSTMENT DISORDER WITH ANXIETY: ICD-10-CM

## 2025-08-05 LAB — HBA1C MFR BLD: 4.6 %

## 2025-08-05 PROCEDURE — 83036 HEMOGLOBIN GLYCOSYLATED A1C: CPT | Performed by: STUDENT IN AN ORGANIZED HEALTH CARE EDUCATION/TRAINING PROGRAM

## 2025-08-05 PROCEDURE — 99204 OFFICE O/P NEW MOD 45 MIN: CPT | Performed by: STUDENT IN AN ORGANIZED HEALTH CARE EDUCATION/TRAINING PROGRAM

## 2025-08-05 RX ORDER — HYDROXYZINE HYDROCHLORIDE 25 MG/1
25 TABLET, FILM COATED ORAL 3 TIMES DAILY PRN
COMMUNITY
Start: 2025-02-11 | End: 2025-08-05

## 2025-08-05 SDOH — ECONOMIC STABILITY: FOOD INSECURITY: WITHIN THE PAST 12 MONTHS, YOU WORRIED THAT YOUR FOOD WOULD RUN OUT BEFORE YOU GOT MONEY TO BUY MORE.: NEVER TRUE

## 2025-08-05 SDOH — ECONOMIC STABILITY: FOOD INSECURITY: WITHIN THE PAST 12 MONTHS, THE FOOD YOU BOUGHT JUST DIDN'T LAST AND YOU DIDN'T HAVE MONEY TO GET MORE.: NEVER TRUE

## 2025-08-05 ASSESSMENT — ENCOUNTER SYMPTOMS
CHEST TIGHTNESS: 0
WHEEZING: 0
EYE DISCHARGE: 0
VOMITING: 0
DIARRHEA: 0
SHORTNESS OF BREATH: 0
CONSTIPATION: 0
NAUSEA: 0
ABDOMINAL PAIN: 0
COUGH: 0
SORE THROAT: 0

## 2025-08-05 ASSESSMENT — PATIENT HEALTH QUESTIONNAIRE - PHQ9
SUM OF ALL RESPONSES TO PHQ QUESTIONS 1-9: 0
2. FEELING DOWN, DEPRESSED OR HOPELESS: NOT AT ALL
SUM OF ALL RESPONSES TO PHQ QUESTIONS 1-9: 0
1. LITTLE INTEREST OR PLEASURE IN DOING THINGS: NOT AT ALL
SUM OF ALL RESPONSES TO PHQ QUESTIONS 1-9: 0
SUM OF ALL RESPONSES TO PHQ QUESTIONS 1-9: 0

## 2025-08-28 ENCOUNTER — HOSPITAL ENCOUNTER (OUTPATIENT)
Age: 35
Setting detail: SPECIMEN
Discharge: HOME | End: 2025-08-28

## 2025-08-28 DIAGNOSIS — R74.8 ELEVATED LIVER ENZYMES: ICD-10-CM

## 2025-08-28 DIAGNOSIS — F43.22 ADJUSTMENT DISORDER WITH ANXIETY: ICD-10-CM

## 2025-08-28 PROBLEM — F43.23 ADJUSTMENT DISORDER WITH MIXED ANXIETY AND DEPRESSED MOOD: Status: ACTIVE | Noted: 2025-08-28

## 2025-08-28 LAB
ALBUMIN SERPL-MCNC: 4.7 G/DL (ref 3.5–5.2)
ALBUMIN/GLOB SERPL: 1.7 {RATIO} (ref 1–2.5)
ALP SERPL-CCNC: 51 U/L (ref 40–129)
ALT SERPL-CCNC: 27 U/L (ref 10–50)
ANION GAP SERPL CALCULATED.3IONS-SCNC: 12 MMOL/L (ref 9–16)
AST SERPL-CCNC: 21 U/L (ref 10–50)
BASOPHILS # BLD: 0.05 K/UL (ref 0–0.2)
BASOPHILS NFR BLD: 1 % (ref 0–2)
BILIRUB SERPL-MCNC: 0.3 MG/DL (ref 0–1.2)
BUN SERPL-MCNC: 17 MG/DL (ref 6–20)
CALCIUM SERPL-MCNC: 9.5 MG/DL (ref 8.6–10.4)
CHLORIDE SERPL-SCNC: 103 MMOL/L (ref 98–107)
CHOLEST SERPL-MCNC: 160 MG/DL (ref 0–199)
CHOLESTEROL/HDL RATIO: 3.7
CO2 SERPL-SCNC: 25 MMOL/L (ref 20–31)
CREAT SERPL-MCNC: 1.1 MG/DL (ref 0.7–1.2)
EOSINOPHIL # BLD: 0.26 K/UL (ref 0–0.44)
EOSINOPHILS RELATIVE PERCENT: 4 % (ref 1–4)
ERYTHROCYTE [DISTWIDTH] IN BLOOD BY AUTOMATED COUNT: 12.8 % (ref 11.8–14.4)
GFR, ESTIMATED: 90 ML/MIN/1.73M2
GLUCOSE SERPL-MCNC: 81 MG/DL (ref 74–99)
HCT VFR BLD AUTO: 47.4 % (ref 40.7–50.3)
HDLC SERPL-MCNC: 43 MG/DL
HGB BLD-MCNC: 15 G/DL (ref 13–17)
IMM GRANULOCYTES # BLD AUTO: <0.03 K/UL (ref 0–0.3)
IMM GRANULOCYTES NFR BLD: 0 %
LDLC SERPL CALC-MCNC: 100 MG/DL (ref 0–100)
LYMPHOCYTES NFR BLD: 1.32 K/UL (ref 1.1–3.7)
LYMPHOCYTES RELATIVE PERCENT: 21 % (ref 24–43)
MCH RBC QN AUTO: 29.5 PG (ref 25.2–33.5)
MCHC RBC AUTO-ENTMCNC: 31.6 G/DL (ref 28.4–34.8)
MCV RBC AUTO: 93.3 FL (ref 82.6–102.9)
MONOCYTES NFR BLD: 0.38 K/UL (ref 0.1–1.2)
MONOCYTES NFR BLD: 6 % (ref 3–12)
NEUTROPHILS NFR BLD: 68 % (ref 36–65)
NEUTS SEG NFR BLD: 4.18 K/UL (ref 1.5–8.1)
NRBC BLD-RTO: 0 PER 100 WBC
PLATELET # BLD AUTO: 294 K/UL (ref 138–453)
PMV BLD AUTO: 9.1 FL (ref 8.1–13.5)
POTASSIUM SERPL-SCNC: 5.1 MMOL/L (ref 3.7–5.3)
PROT SERPL-MCNC: 7.4 G/DL (ref 6.6–8.7)
RBC # BLD AUTO: 5.08 M/UL (ref 4.21–5.77)
SODIUM SERPL-SCNC: 140 MMOL/L (ref 136–145)
TRIGL SERPL-MCNC: 87 MG/DL
TSH SERPL DL<=0.05 MIU/L-ACNC: 1.35 UIU/ML (ref 0.27–4.2)
VLDLC SERPL CALC-MCNC: 17 MG/DL (ref 1–30)
WBC OTHER # BLD: 6.2 K/UL (ref 3.5–11.3)

## (undated) DEVICE — LIQUIBAND RAPID ADHESIVE 36/CS 0.8ML: Brand: MEDLINE

## (undated) DEVICE — DRAPE,U/ SHT,SPLIT,PLAS,STERIL: Brand: MEDLINE

## (undated) DEVICE — GLOVE ORANGE PI 8   MSG9080

## (undated) DEVICE — BIT DRL L110MM DIA1.8MM QUIK CPL CALIB W/O STP REUSE

## (undated) DEVICE — SPLINT THMB W3XL12IN FBRGLS PD PRECUT LTWT DURABLE FAST SET

## (undated) DEVICE — BIT DRL L100MM DIA2MM QUIK CPL W/O STP REUSE

## (undated) DEVICE — CLOTH PRE OP W9XL10.5IN 2% CHG FRAGRANCE RNS FREE READYPREP

## (undated) DEVICE — GLOVE SURG SZ 8 L12IN THK75MIL DK GRN LTX FREE

## (undated) DEVICE — TUBING, SUCTION, 9/32" X 20', STRAIGHT: Brand: MEDLINE INDUSTRIES, INC.

## (undated) DEVICE — SOLUTION IRRIG 1000ML 0.9% SOD CHL USP POUR PLAS BTL

## (undated) DEVICE — PADDING,UNDERCAST,COTTON, 4"X4YD STERILE: Brand: MEDLINE

## (undated) DEVICE — 60-7070-103 TRNQT,DPSB,PLC RED: Brand: MEDLINE RENEWAL

## (undated) DEVICE — DRAPE SURG W41XL74IN CLR FULL SZ C ARM 3 ADH POLY STRP E

## (undated) DEVICE — GLOVE SURG SZ 65 THK91MIL LTX FREE SYN POLYISOPRENE

## (undated) DEVICE — INTENDED FOR TISSUE SEPARATION, AND OTHER PROCEDURES THAT REQUIRE A SHARP SURGICAL BLADE TO PUNCTURE OR CUT.: Brand: BARD-PARKER ® CARBON RIB-BACK BLADES

## (undated) DEVICE — K WIRE FIX L150MM DIA1.25MM S STL TRCR PNT
Type: IMPLANTABLE DEVICE | Site: WRIST | Status: NON-FUNCTIONAL
Removed: 2024-05-31

## (undated) DEVICE — ELECTRODE PT RET AD L9FT HI MOIST COND ADH HYDRGEL CORDED

## (undated) DEVICE — MHPB HAND AND FOOT PACK: Brand: MEDLINE INDUSTRIES, INC.

## (undated) DEVICE — SUTURE MONOCRYL + SZ 4-0 L27IN ABSRB UD L19MM PS-2 3/8 CIR MCP426H

## (undated) DEVICE — IMMOBILIZER ORTH CONTACT CLOSURE STRP LG 18X9 IN PROCARE

## (undated) DEVICE — TUBING SUCT 12FR MAL ALUM SHFT FN CAP VENT UNIV CONN W/ OBT

## (undated) DEVICE — APPLICATOR MEDICATED 26 CC SOLUTION HI LT ORNG CHLORAPREP

## (undated) DEVICE — SOLUTION IRRIG 1000ML STRL H2O USP PLAS POUR BTL